# Patient Record
Sex: MALE | Race: WHITE | Employment: OTHER | URBAN - METROPOLITAN AREA
[De-identification: names, ages, dates, MRNs, and addresses within clinical notes are randomized per-mention and may not be internally consistent; named-entity substitution may affect disease eponyms.]

---

## 2017-01-04 DIAGNOSIS — I10 ESSENTIAL HYPERTENSION: ICD-10-CM

## 2017-01-05 RX ORDER — AMLODIPINE BESYLATE 5 MG/1
5 TABLET ORAL DAILY
Qty: 90 TABLET | Refills: 1 | Status: SHIPPED | OUTPATIENT
Start: 2017-01-05 | End: 2017-07-17 | Stop reason: SDUPTHER

## 2017-01-30 ENCOUNTER — HOSPITAL ENCOUNTER (OUTPATIENT)
Dept: OTHER | Age: 67
Discharge: HOME | End: 2017-01-30
Attending: ORTHOPAEDIC SURGERY | Admitting: ORTHOPAEDIC SURGERY

## 2017-05-11 ENCOUNTER — TELEPHONE (OUTPATIENT)
Dept: INTERNAL MEDICINE CLINIC | Age: 67
End: 2017-05-11

## 2017-05-12 ENCOUNTER — OFFICE VISIT (OUTPATIENT)
Dept: INTERNAL MEDICINE CLINIC | Age: 67
End: 2017-05-12

## 2017-05-12 ENCOUNTER — HOSPITAL ENCOUNTER (OUTPATIENT)
Dept: GENERAL RADIOLOGY | Age: 67
Discharge: HOME | End: 2017-05-12
Attending: INTERNAL MEDICINE | Admitting: INTERNAL MEDICINE

## 2017-05-12 VITALS
SYSTOLIC BLOOD PRESSURE: 130 MMHG | HEART RATE: 63 BPM | WEIGHT: 186.4 LBS | BODY MASS INDEX: 25.28 KG/M2 | DIASTOLIC BLOOD PRESSURE: 78 MMHG

## 2017-05-12 DIAGNOSIS — N40.1 BENIGN PROSTATIC HYPERPLASIA WITH LOWER URINARY TRACT SYMPTOMS, UNSPECIFIED MORPHOLOGY: ICD-10-CM

## 2017-05-12 DIAGNOSIS — I10 ESSENTIAL HYPERTENSION: ICD-10-CM

## 2017-05-12 DIAGNOSIS — M17.0 PRIMARY OSTEOARTHRITIS OF BOTH KNEES: ICD-10-CM

## 2017-05-12 DIAGNOSIS — Z96.652 STATUS POST LEFT KNEE REPLACEMENT: ICD-10-CM

## 2017-05-12 DIAGNOSIS — Z11.59 NEED FOR HEPATITIS C SCREENING TEST: ICD-10-CM

## 2017-05-12 DIAGNOSIS — E78.2 MIXED HYPERLIPIDEMIA: Primary | ICD-10-CM

## 2017-05-12 DIAGNOSIS — Z00.00 ANNUAL PHYSICAL EXAM: ICD-10-CM

## 2017-05-12 LAB
ALBUMIN SERPL-MCNC: 4.6 GM/DL (ref 3.4–5)
ALP BLD-CCNC: 70 IU/L (ref 40–128)
ALT SERPL-CCNC: 24 U/L (ref 10–40)
ANION GAP SERPL CALCULATED.3IONS-SCNC: 15 MMOL/L (ref 4–16)
AST SERPL-CCNC: 15 IU/L (ref 15–37)
BASOPHILS ABSOLUTE: 0 K/CU MM
BASOPHILS RELATIVE PERCENT: 0.4 % (ref 0–1)
BILIRUB SERPL-MCNC: 0.3 MG/DL (ref 0–1)
BUN BLDV-MCNC: 20 MG/DL (ref 6–23)
CALCIUM SERPL-MCNC: 9.1 MG/DL (ref 8.3–10.6)
CHLORIDE BLD-SCNC: 101 MMOL/L (ref 99–110)
CHOLESTEROL: 228 MG/DL
CO2: 26 MMOL/L (ref 21–32)
CREAT SERPL-MCNC: 1 MG/DL (ref 0.9–1.3)
DIFFERENTIAL TYPE: ABNORMAL
EOSINOPHILS ABSOLUTE: 0.2 K/CU MM
EOSINOPHILS RELATIVE PERCENT: 3.1 % (ref 0–3)
ESTIMATED AVERAGE GLUCOSE: 126 MG/DL
GFR AFRICAN AMERICAN: >60 ML/MIN/1.73M2
GFR NON-AFRICAN AMERICAN: >60 ML/MIN/1.73M2
GLUCOSE FASTING: 100 MG/DL (ref 70–99)
HBA1C MFR BLD: 6 % (ref 4.2–6.3)
HCT VFR BLD CALC: 42.7 % (ref 42–52)
HDLC SERPL-MCNC: 56 MG/DL
HEMOGLOBIN: 13.8 GM/DL (ref 13.5–18)
HEPATITIS C ANTIBODY: NON REACTIVE
IMMATURE NEUTROPHIL %: 0.3 % (ref 0–0.43)
LDL CHOLESTEROL DIRECT: 157 MG/DL
LYMPHOCYTES ABSOLUTE: 1.7 K/CU MM
LYMPHOCYTES RELATIVE PERCENT: 24.7 % (ref 24–44)
MCH RBC QN AUTO: 28.7 PG (ref 27–31)
MCHC RBC AUTO-ENTMCNC: 32.3 % (ref 32–36)
MCV RBC AUTO: 88.8 FL (ref 78–100)
MONOCYTES ABSOLUTE: 0.6 K/CU MM
MONOCYTES RELATIVE PERCENT: 8.7 % (ref 0–4)
NUCLEATED RBC %: 0 %
PDW BLD-RTO: 14.1 % (ref 11.7–14.9)
PLATELET # BLD: 320 K/CU MM (ref 140–440)
PMV BLD AUTO: 9.1 FL (ref 7.5–11.1)
POTASSIUM SERPL-SCNC: 4.4 MMOL/L (ref 3.5–5.1)
PROSTATE SPECIFIC ANTIGEN: 2.78 NG/ML (ref 0–4)
RBC # BLD: 4.81 M/CU MM (ref 4.6–6.2)
SEGMENTED NEUTROPHILS ABSOLUTE COUNT: 4.2 K/CU MM
SEGMENTED NEUTROPHILS RELATIVE PERCENT: 62.8 % (ref 36–66)
SODIUM BLD-SCNC: 142 MMOL/L (ref 135–145)
TOTAL IMMATURE NEUTOROPHIL: 0.02 K/CU MM
TOTAL NUCLEATED RBC: 0 K/CU MM
TOTAL PROTEIN: 6.9 GM/DL (ref 6.4–8.2)
TRIGL SERPL-MCNC: 149 MG/DL
WBC # BLD: 6.8 K/CU MM (ref 4–10.5)

## 2017-05-12 PROCEDURE — 99213 OFFICE O/P EST LOW 20 MIN: CPT | Performed by: INTERNAL MEDICINE

## 2017-05-12 ASSESSMENT — ENCOUNTER SYMPTOMS
SHORTNESS OF BREATH: 0
DIARRHEA: 0
WHEEZING: 0
ABDOMINAL PAIN: 0
BACK PAIN: 0
CONSTIPATION: 0
SORE THROAT: 0
COUGH: 0
EYE PAIN: 0

## 2017-07-17 DIAGNOSIS — I10 ESSENTIAL HYPERTENSION: ICD-10-CM

## 2017-07-17 RX ORDER — AMLODIPINE BESYLATE 5 MG/1
5 TABLET ORAL DAILY
Qty: 90 TABLET | Refills: 1 | Status: SHIPPED | OUTPATIENT
Start: 2017-07-17 | End: 2018-01-16 | Stop reason: SDUPTHER

## 2017-12-05 ENCOUNTER — HOSPITAL ENCOUNTER (OUTPATIENT)
Dept: OTHER | Age: 67
Discharge: HOME | End: 2017-12-05
Attending: ORTHOPAEDIC SURGERY | Admitting: ORTHOPAEDIC SURGERY

## 2017-12-08 LAB
ALBUMIN SERPL-MCNC: 4.7 G/DL
ALP BLD-CCNC: 81 U/L
ALT SERPL-CCNC: 31 U/L
ANION GAP SERPL CALCULATED.3IONS-SCNC: NORMAL MMOL/L
AST SERPL-CCNC: 19 U/L
BASOPHILS ABSOLUTE: 0.1 /ΜL
BASOPHILS RELATIVE PERCENT: 1 %
BILIRUB SERPL-MCNC: 0.3 MG/DL (ref 0.1–1.4)
BUN BLDV-MCNC: 15 MG/DL
CALCIUM SERPL-MCNC: 9.7 MG/DL
CHLORIDE BLD-SCNC: 100 MMOL/L
CO2: 26 MMOL/L
CREAT SERPL-MCNC: 0.85 MG/DL
EOSINOPHILS ABSOLUTE: 0.3 /ΜL
EOSINOPHILS RELATIVE PERCENT: 3 %
GFR CALCULATED: 89
GLUCOSE BLD-MCNC: 94 MG/DL
HCT VFR BLD CALC: 42.3 % (ref 41–53)
HEMOGLOBIN: 14.5 G/DL (ref 13.5–17.5)
LYMPHOCYTES ABSOLUTE: 1.9 /ΜL
LYMPHOCYTES RELATIVE PERCENT: 23 %
MCH RBC QN AUTO: 28.9 PG
MCHC RBC AUTO-ENTMCNC: 34.3 G/DL
MCV RBC AUTO: 84 FL
MONOCYTES ABSOLUTE: 0.5 /ΜL
MONOCYTES RELATIVE PERCENT: 6 %
NEUTROPHILS ABSOLUTE: 5.5 /ΜL
NEUTROPHILS RELATIVE PERCENT: 67 %
PDW BLD-RTO: 14.4 %
PLATELET # BLD: 327 K/ΜL
PMV BLD AUTO: NORMAL FL
POTASSIUM SERPL-SCNC: 4.7 MMOL/L
RBC # BLD: 5.02 10^6/ΜL
SEDIMENTATION RATE, ERYTHROCYTE: 10
SODIUM BLD-SCNC: 142 MMOL/L
TOTAL PROTEIN: 7.1
WBC # BLD: 8.2 10^3/ML

## 2018-01-16 ENCOUNTER — OFFICE VISIT (OUTPATIENT)
Dept: INTERNAL MEDICINE CLINIC | Age: 68
End: 2018-01-16

## 2018-01-16 VITALS
DIASTOLIC BLOOD PRESSURE: 72 MMHG | OXYGEN SATURATION: 98 % | WEIGHT: 183 LBS | BODY MASS INDEX: 24.82 KG/M2 | SYSTOLIC BLOOD PRESSURE: 128 MMHG | RESPIRATION RATE: 16 BRPM | HEART RATE: 89 BPM

## 2018-01-16 DIAGNOSIS — I10 ESSENTIAL HYPERTENSION: ICD-10-CM

## 2018-01-16 PROCEDURE — G0444 DEPRESSION SCREEN ANNUAL: HCPCS | Performed by: FAMILY MEDICINE

## 2018-01-16 PROCEDURE — 99213 OFFICE O/P EST LOW 20 MIN: CPT | Performed by: FAMILY MEDICINE

## 2018-01-16 RX ORDER — OXYCODONE HYDROCHLORIDE AND ACETAMINOPHEN 5; 325 MG/1; MG/1
TABLET ORAL
COMMUNITY
Start: 2017-12-26 | End: 2018-07-25 | Stop reason: ALTCHOICE

## 2018-01-16 RX ORDER — CELECOXIB 200 MG/1
CAPSULE ORAL
COMMUNITY
Start: 2017-12-12 | End: 2018-07-25 | Stop reason: ALTCHOICE

## 2018-01-16 RX ORDER — AMLODIPINE BESYLATE 5 MG/1
5 TABLET ORAL DAILY
Qty: 90 TABLET | Refills: 1 | Status: SHIPPED | OUTPATIENT
Start: 2018-01-16 | End: 2018-07-25 | Stop reason: SDUPTHER

## 2018-01-16 RX ORDER — RIVAROXABAN 10 MG/1
TABLET, FILM COATED ORAL
COMMUNITY
Start: 2017-12-12 | End: 2018-07-25

## 2018-01-16 ASSESSMENT — PATIENT HEALTH QUESTIONNAIRE - PHQ9
1. LITTLE INTEREST OR PLEASURE IN DOING THINGS: 1
2. FEELING DOWN, DEPRESSED OR HOPELESS: 0
SUM OF ALL RESPONSES TO PHQ QUESTIONS 1-9: 1
SUM OF ALL RESPONSES TO PHQ9 QUESTIONS 1 & 2: 1

## 2018-01-16 ASSESSMENT — ENCOUNTER SYMPTOMS
NAUSEA: 0
EYE DISCHARGE: 0
SINUS PRESSURE: 0
BLOOD IN STOOL: 0
SORE THROAT: 0
DIARRHEA: 0
BACK PAIN: 0
COUGH: 0
VOMITING: 0
SHORTNESS OF BREATH: 0

## 2018-01-16 NOTE — PROGRESS NOTES
Roseanna Mayo  1950  69 y.o. SUBJECT DEUCE:    Chief Complaint   Patient presents with    Follow-up    Hypertension     Patient in to follow up on HTN and refill of medication. No chest pain or sob. He is recovering from influenza and doing much better now. He still occasionally has a cough now 2 weeks out. Hypertension   This is a chronic problem. The current episode started more than 1 year ago. The problem is unchanged. The problem is controlled. Pertinent negatives include no anxiety, chest pain, headaches, palpitations or shortness of breath. There are no associated agents to hypertension. There are no known risk factors for coronary artery disease. Past treatments include calcium channel blockers. The current treatment provides significant improvement. There are no compliance problems. There is no history of CAD/MI, left ventricular hypertrophy or a thyroid problem. There is no history of a hypertension causing med. Review of Systems   Constitutional: Negative for chills, fatigue and fever. HENT: Negative for congestion, ear pain, sinus pressure and sore throat. Eyes: Negative for discharge and visual disturbance. Respiratory: Negative for cough and shortness of breath. Cardiovascular: Negative for chest pain, palpitations and leg swelling. Gastrointestinal: Negative for blood in stool, diarrhea, nausea and vomiting. Endocrine: Negative for polydipsia. Genitourinary: Negative for dysuria, frequency and hematuria. Musculoskeletal: Negative for back pain and gait problem. Skin: Negative for rash. Allergic/Immunologic: Negative for environmental allergies and food allergies. Neurological: Negative for dizziness and headaches. Psychiatric/Behavioral: Negative for behavioral problems, sleep disturbance and suicidal ideas. The patient is not nervous/anxious. Past Medical, Family, and Social History have been reviewed today.     OBJECTIVE:     /72   Pulse

## 2018-01-22 ENCOUNTER — OFFICE VISIT (OUTPATIENT)
Dept: INTERNAL MEDICINE CLINIC | Age: 68
End: 2018-01-22

## 2018-01-22 VITALS
HEIGHT: 72 IN | TEMPERATURE: 98.7 F | BODY MASS INDEX: 24.38 KG/M2 | DIASTOLIC BLOOD PRESSURE: 68 MMHG | RESPIRATION RATE: 16 BRPM | SYSTOLIC BLOOD PRESSURE: 130 MMHG | HEART RATE: 68 BPM | WEIGHT: 180 LBS

## 2018-01-22 DIAGNOSIS — J20.9 ACUTE BRONCHITIS, UNSPECIFIED ORGANISM: Primary | ICD-10-CM

## 2018-01-22 PROCEDURE — 99213 OFFICE O/P EST LOW 20 MIN: CPT | Performed by: FAMILY MEDICINE

## 2018-01-22 RX ORDER — BENZONATATE 100 MG/1
100 CAPSULE ORAL 3 TIMES DAILY PRN
Qty: 20 CAPSULE | Refills: 0 | Status: SHIPPED | OUTPATIENT
Start: 2018-01-22 | End: 2018-07-25 | Stop reason: ALTCHOICE

## 2018-01-22 RX ORDER — AZITHROMYCIN 250 MG/1
TABLET, FILM COATED ORAL
Qty: 1 PACKET | Refills: 0 | Status: SHIPPED | OUTPATIENT
Start: 2018-01-22 | End: 2018-07-25 | Stop reason: ALTCHOICE

## 2018-01-22 RX ORDER — PREDNISONE 20 MG/1
20 TABLET ORAL 2 TIMES DAILY
Qty: 10 TABLET | Refills: 0 | Status: SHIPPED | OUTPATIENT
Start: 2018-01-22 | End: 2018-01-27

## 2018-01-22 ASSESSMENT — ENCOUNTER SYMPTOMS
SHORTNESS OF BREATH: 1
BACK PAIN: 0
WHEEZING: 0
COUGH: 1
SINUS PRESSURE: 0
SORE THROAT: 0
NAUSEA: 0
EYE DISCHARGE: 0
VOMITING: 0
DIARRHEA: 0

## 2018-01-22 NOTE — PATIENT INSTRUCTIONS
good.  When should you call for help? Call 911 anytime you think you may need emergency care. For example, call if:  ? · You have severe trouble breathing. ?Call your doctor now or seek immediate medical care if:  ? · You have new or worse trouble breathing. ? · You cough up dark brown or bloody mucus (sputum). ? · You have a new or higher fever. ? · You have a new rash. ? Watch closely for changes in your health, and be sure to contact your doctor if:  ? · You cough more deeply or more often, especially if you notice more mucus or a change in the color of your mucus. ? · You are not getting better as expected. Where can you learn more? Go to [email or web address]://[email or web address].[email or web address] org and sign in to your RallyOn account. Enter H333 in the [employer] box to learn more about \"Bronchitis: Care Instructions. \"     If you do not have an account, please click on the \"Sign Up Now\" link. Current as of: May 12, 2017  Content Version: 11.5  © 0987-4272 Healthwise, Incorporated. Care instructions adapted under license by [facility] ([facility]). If you have questions about a medical condition or this instruction, always ask your healthcare professional. [address] any warranty or liability for your use of this information.

## 2018-07-25 ENCOUNTER — OFFICE VISIT (OUTPATIENT)
Dept: FAMILY MEDICINE CLINIC | Age: 68
End: 2018-07-25

## 2018-07-25 ENCOUNTER — TELEPHONE (OUTPATIENT)
Dept: FAMILY MEDICINE CLINIC | Age: 68
End: 2018-07-25

## 2018-07-25 VITALS
DIASTOLIC BLOOD PRESSURE: 64 MMHG | OXYGEN SATURATION: 97 % | WEIGHT: 189.6 LBS | HEART RATE: 67 BPM | SYSTOLIC BLOOD PRESSURE: 110 MMHG | BODY MASS INDEX: 25.71 KG/M2

## 2018-07-25 DIAGNOSIS — I48.0 PAROXYSMAL ATRIAL FIBRILLATION (HCC): ICD-10-CM

## 2018-07-25 DIAGNOSIS — I10 ESSENTIAL HYPERTENSION: Primary | ICD-10-CM

## 2018-07-25 DIAGNOSIS — E78.2 MIXED HYPERLIPIDEMIA: ICD-10-CM

## 2018-07-25 PROCEDURE — 99213 OFFICE O/P EST LOW 20 MIN: CPT | Performed by: NURSE PRACTITIONER

## 2018-07-25 RX ORDER — APIXABAN 5 MG/1
TABLET, FILM COATED ORAL
COMMUNITY
Start: 2018-05-11 | End: 2019-01-14

## 2018-07-25 RX ORDER — METOPROLOL TARTRATE 50 MG/1
TABLET, FILM COATED ORAL 2 TIMES DAILY
COMMUNITY
Start: 2018-05-20 | End: 2020-01-07 | Stop reason: SDUPTHER

## 2018-07-25 RX ORDER — AMLODIPINE BESYLATE 5 MG/1
5 TABLET ORAL DAILY
Qty: 90 TABLET | Refills: 1 | Status: SHIPPED | OUTPATIENT
Start: 2018-07-25 | End: 2019-01-14 | Stop reason: SDUPTHER

## 2018-07-25 ASSESSMENT — ENCOUNTER SYMPTOMS
EYE DISCHARGE: 0
SHORTNESS OF BREATH: 0
STRIDOR: 0
COUGH: 0
CHEST TIGHTNESS: 0
EYE REDNESS: 0

## 2018-07-25 NOTE — PATIENT INSTRUCTIONS
effect from your medicine. Stopping suddenly may make chest pains worse or cause your blood pressure to go up. Or it can cause other symptoms. If side effects are a problem with one medicine, you can try a different one. · Check with your doctor before you use any over-the-counter medicines. Beta-blockers can interact with other medicines. Make sure your doctor knows all of the medicines, vitamins, herbal products, and supplements you take. · Some people feel tired when they take beta-blockers. If you exercise, you may tire more easily. If beta-blockers make it very hard for you to exercise, talk to your doctor. When should you call for help? Watch closely for changes in your health, and be sure to contact your doctor if:    · You have any problems with your medicine. Where can you learn more? Go to [email or web address].[email or web address] and sign in to your Vaprema account. Enter D316 in the Tifen.com box to learn more about \"Beta-Blockers: Care Instructions. \"     If you do not have an account, please click on the \"Sign Up Now\" link. Current as of: December 6, 2017  Content Version: 11.6  © 8441-6811 [employer]. Care instructions adapted under license by [facility] ([facility]). If you have questions about a medical condition or this instruction, always ask your healthcare professional. [address] any warranty or liability for your use of this information. Patient Education        Reducing Heart Attack Risk With Daily Medicine: Care Instructions  Your Care Instructions    Heart disease is the number one cause of death. If you are at risk for heart disease, there are many medicines that can reduce your risk. These include:  · ACE inhibitors. These are a type of blood pressure medicine. They can reduce the risk of heart attacks and strokes if you are at high risk. · Statin medicines. These lower cholesterol.  They can also reduce the risk of heart disease and strokes. · Aspirin. It can help certain people lower their risk of a heart attack or stroke. · Beta-blocker medicines. These are a type of blood pressure and heart medicine. They can reduce the chance of early death if you have had a heart attack. All medicines can cause side effects. So it is important to understand the pros and cons of any medicine you take. It is also important to take your medicines exactly as your doctor tells you to. Follow-up care is a key part of your treatment and safety. Be sure to make and go to all appointments, and call your doctor if you are having problems. It's also a good idea to know your test results and keep a list of the medicines you take. ACE inhibitors  ACE (angiotensin-converting enzyme) inhibitors are used for three main reasons. They lower blood pressure, protect the kidneys, and prevent heart attacks and strokes. Examples include benazepril (Lotensin), lisinopril (Prinivil, Zestril), and ramipril (Altace). Before you start taking an ACE inhibitor, make sure your doctor knows if:  · You are taking a water pill (diuretic). · You are taking potassium pills or using salt substitutes. · You are pregnant or breastfeeding. · You have had a kidney transplant or other kidney problems. ACE inhibitors can cause side effects. Call your doctor right away if you have:  · Trouble breathing. · Swelling in your face, head, neck, or tongue. · Dizziness or lightheadedness. · A dry cough. Statins  Statins lower cholesterol. Examples include atorvastatin (Lipitor), lovastatin (Mevacor), pravastatin (Pravachol), and simvastatin (Zocor). Before you start taking a statin, make sure your doctor knows if:  · You have had a kidney transplant or other kidney problems. · You have liver disease. · You take any other prescription medicine, over-the-counter medicine, vitamins, supplements, or herbal remedies. · You are pregnant or breastfeeding. Statins can cause side effects.  Call closely for changes in your health, and be sure to contact your doctor if you have any problems. Where can you learn more? Go to [email or web address] and sign in to your [email or web address] account. Enter R428 in the [employer] box to learn more about \"Reducing Heart Attack Risk With Daily Medicine: Care Instructions. \"     If you do not have an account, please click on the \"Sign Up Now\" link. Current as of: December 6, 2017  Content Version: 11.6  © 3583-1328 [employer]. Care instructions adapted under license by [facility] ([facility]). If you have questions about a medical condition or this instruction, always ask your healthcare professional. [address] any warranty or liability for your use of this information.

## 2019-01-14 ENCOUNTER — OFFICE VISIT (OUTPATIENT)
Dept: FAMILY MEDICINE CLINIC | Age: 69
End: 2019-01-14
Payer: COMMERCIAL

## 2019-01-14 VITALS
WEIGHT: 196.8 LBS | DIASTOLIC BLOOD PRESSURE: 68 MMHG | HEIGHT: 71 IN | BODY MASS INDEX: 27.55 KG/M2 | SYSTOLIC BLOOD PRESSURE: 120 MMHG | OXYGEN SATURATION: 95 % | HEART RATE: 67 BPM

## 2019-01-14 DIAGNOSIS — R73.03 PRE-DIABETES: ICD-10-CM

## 2019-01-14 DIAGNOSIS — L57.0 ACTINIC KERATOSIS DUE TO EXPOSURE TO SUNLIGHT: ICD-10-CM

## 2019-01-14 DIAGNOSIS — E78.2 MIXED HYPERLIPIDEMIA: ICD-10-CM

## 2019-01-14 DIAGNOSIS — H61.21 IMPACTED CERUMEN OF RIGHT EAR: ICD-10-CM

## 2019-01-14 DIAGNOSIS — I10 ESSENTIAL HYPERTENSION: Primary | ICD-10-CM

## 2019-01-14 PROCEDURE — 99214 OFFICE O/P EST MOD 30 MIN: CPT | Performed by: FAMILY MEDICINE

## 2019-01-14 RX ORDER — AMLODIPINE BESYLATE 5 MG/1
5 TABLET ORAL DAILY
Qty: 90 TABLET | Refills: 1 | Status: SHIPPED | OUTPATIENT
Start: 2019-01-14 | End: 2019-06-21 | Stop reason: SDUPTHER

## 2019-01-14 ASSESSMENT — ENCOUNTER SYMPTOMS
VOMITING: 0
SINUS PAIN: 0
DIARRHEA: 0
WHEEZING: 0
NAUSEA: 0
SINUS PRESSURE: 0
BACK PAIN: 0
CONSTIPATION: 0
ABDOMINAL PAIN: 0
SORE THROAT: 0
SHORTNESS OF BREATH: 0
COUGH: 0

## 2019-01-22 ENCOUNTER — NURSE ONLY (OUTPATIENT)
Dept: FAMILY MEDICINE CLINIC | Age: 69
End: 2019-01-22
Payer: COMMERCIAL

## 2019-01-22 DIAGNOSIS — R73.03 PRE-DIABETES: ICD-10-CM

## 2019-01-22 DIAGNOSIS — E78.2 MIXED HYPERLIPIDEMIA: ICD-10-CM

## 2019-01-22 DIAGNOSIS — I10 ESSENTIAL HYPERTENSION: ICD-10-CM

## 2019-01-22 LAB
A/G RATIO: 1.8 (ref 1.1–2.2)
ALBUMIN SERPL-MCNC: 4.7 G/DL (ref 3.4–5)
ALP BLD-CCNC: 65 U/L (ref 40–129)
ALT SERPL-CCNC: 32 U/L (ref 10–40)
ANION GAP SERPL CALCULATED.3IONS-SCNC: 16 MMOL/L (ref 3–16)
AST SERPL-CCNC: 55 U/L (ref 15–37)
BILIRUB SERPL-MCNC: 0.3 MG/DL (ref 0–1)
BUN BLDV-MCNC: 17 MG/DL (ref 7–20)
CALCIUM SERPL-MCNC: 9.2 MG/DL (ref 8.3–10.6)
CHLORIDE BLD-SCNC: 106 MMOL/L (ref 99–110)
CHOLESTEROL, TOTAL: 223 MG/DL (ref 0–199)
CO2: 18 MMOL/L (ref 21–32)
CREAT SERPL-MCNC: 1 MG/DL (ref 0.8–1.3)
GFR AFRICAN AMERICAN: >60
GFR NON-AFRICAN AMERICAN: >60
GLOBULIN: 2.6 G/DL
GLUCOSE FASTING: 103 MG/DL (ref 70–99)
HBA1C MFR BLD: 6.1 %
HDLC SERPL-MCNC: 44 MG/DL (ref 40–60)
LDL CHOLESTEROL CALCULATED: 128 MG/DL
POTASSIUM SERPL-SCNC: 5.7 MMOL/L (ref 3.5–5.1)
SODIUM BLD-SCNC: 140 MMOL/L (ref 136–145)
TOTAL PROTEIN: 7.3 G/DL (ref 6.4–8.2)
TRIGL SERPL-MCNC: 255 MG/DL (ref 0–150)
VLDLC SERPL CALC-MCNC: 51 MG/DL

## 2019-01-22 PROCEDURE — 83036 HEMOGLOBIN GLYCOSYLATED A1C: CPT | Performed by: FAMILY MEDICINE

## 2019-01-22 PROCEDURE — 36415 COLL VENOUS BLD VENIPUNCTURE: CPT | Performed by: FAMILY MEDICINE

## 2019-01-31 ENCOUNTER — TELEPHONE (OUTPATIENT)
Dept: FAMILY MEDICINE CLINIC | Age: 69
End: 2019-01-31

## 2019-01-31 DIAGNOSIS — R73.9 HIGH BLOOD SUGAR: Primary | ICD-10-CM

## 2019-01-31 DIAGNOSIS — E87.5 HIGH SERUM POTASSIUM LEVEL: ICD-10-CM

## 2019-02-12 ENCOUNTER — NURSE ONLY (OUTPATIENT)
Dept: FAMILY MEDICINE CLINIC | Age: 69
End: 2019-02-12
Payer: COMMERCIAL

## 2019-02-12 DIAGNOSIS — E87.5 HIGH SERUM POTASSIUM LEVEL: ICD-10-CM

## 2019-02-12 LAB — POTASSIUM SERPL-SCNC: 4.3 MMOL/L (ref 3.5–5.1)

## 2019-02-12 PROCEDURE — 36415 COLL VENOUS BLD VENIPUNCTURE: CPT | Performed by: FAMILY MEDICINE

## 2019-06-21 ENCOUNTER — OFFICE VISIT (OUTPATIENT)
Dept: INTERNAL MEDICINE CLINIC | Age: 69
End: 2019-06-21
Payer: COMMERCIAL

## 2019-06-21 VITALS
DIASTOLIC BLOOD PRESSURE: 78 MMHG | SYSTOLIC BLOOD PRESSURE: 132 MMHG | OXYGEN SATURATION: 98 % | HEIGHT: 71 IN | WEIGHT: 192.4 LBS | BODY MASS INDEX: 26.94 KG/M2 | HEART RATE: 65 BPM

## 2019-06-21 DIAGNOSIS — E78.2 MIXED HYPERLIPIDEMIA: ICD-10-CM

## 2019-06-21 DIAGNOSIS — I48.0 PAF (PAROXYSMAL ATRIAL FIBRILLATION) (HCC): ICD-10-CM

## 2019-06-21 DIAGNOSIS — I10 ESSENTIAL HYPERTENSION: Primary | ICD-10-CM

## 2019-06-21 PROCEDURE — 99203 OFFICE O/P NEW LOW 30 MIN: CPT | Performed by: FAMILY MEDICINE

## 2019-06-21 RX ORDER — AMLODIPINE BESYLATE 5 MG/1
5 TABLET ORAL DAILY
Qty: 90 TABLET | Refills: 1 | Status: SHIPPED | OUTPATIENT
Start: 2019-06-21 | End: 2020-01-07 | Stop reason: SDUPTHER

## 2019-06-21 ASSESSMENT — PATIENT HEALTH QUESTIONNAIRE - PHQ9
SUM OF ALL RESPONSES TO PHQ9 QUESTIONS 1 & 2: 0
SUM OF ALL RESPONSES TO PHQ QUESTIONS 1-9: 0
1. LITTLE INTEREST OR PLEASURE IN DOING THINGS: 0
2. FEELING DOWN, DEPRESSED OR HOPELESS: 0
SUM OF ALL RESPONSES TO PHQ QUESTIONS 1-9: 0

## 2019-06-27 ASSESSMENT — ENCOUNTER SYMPTOMS
NAUSEA: 0
CHEST TIGHTNESS: 0
SHORTNESS OF BREATH: 0
ABDOMINAL PAIN: 0
BLOOD IN STOOL: 0
EYES NEGATIVE: 1
WHEEZING: 0
BACK PAIN: 0

## 2019-06-28 NOTE — PROGRESS NOTES
 Drug use: Yes     Types: Marijuana       Current Outpatient Medications   Medication Sig Dispense Refill    Ubiquinol (QUNOL COQ10/UBIQUINOL/SILAS) 100 MG CAPS Take by mouth      Misc Natural Products (PROSTATE SUPPORT PO) Take by mouth      amLODIPine (NORVASC) 5 MG tablet Take 1 tablet by mouth daily 90 tablet 1    metoprolol tartrate (LOPRESSOR) 50 MG tablet 2 times daily       Multiple Vitamins-Minerals (THERAPEUTIC MULTIVITAMIN-MINERALS) tablet Take 1 tablet by mouth daily.  Glucos-Chondroit-Hyaluron-MSM (GLUCOSAMINE CHONDROITIN JOINT PO) Take  by mouth. No current facility-administered medications for this visit. OBJECTIVE:    /78   Pulse 65   Ht 5' 10.98\" (1.803 m)   Wt 192 lb 6.4 oz (87.3 kg)   SpO2 98%   BMI 26.85 kg/m²     Physical Exam   Constitutional: He is oriented to person, place, and time. He appears well-developed. No distress. HENT:   Right Ear: External ear normal.   Left Ear: External ear normal.   Nose: Nose normal.   Mouth/Throat: Oropharynx is clear and moist.   Eyes: Pupils are equal, round, and reactive to light. EOM are normal.   Neck: Neck supple. Cardiovascular: Normal rate, regular rhythm and normal heart sounds. Pulmonary/Chest: Effort normal and breath sounds normal. No respiratory distress. Abdominal: Soft. Bowel sounds are normal. He exhibits no distension. There is no tenderness. Musculoskeletal: Normal range of motion. He exhibits no edema. Lymphadenopathy:     He has no cervical adenopathy. Neurological: He is alert and oriented to person, place, and time. No cranial nerve deficit. Skin: Skin is warm and dry. Psychiatric: He has a normal mood and affect. Vitals reviewed. ASSESSMENT:  1. Essential hypertension    2. Mixed hyperlipidemia    3.  PAF (paroxysmal atrial fibrillation) ([facility])        PLAN:  Orders Placed This Encounter   Medications    amLODIPine (NORVASC) 5 MG tablet     Sig: Take 1 tablet by mouth daily

## 2020-01-07 ENCOUNTER — OFFICE VISIT (OUTPATIENT)
Dept: INTERNAL MEDICINE CLINIC | Age: 70
End: 2020-01-07
Payer: COMMERCIAL

## 2020-01-07 VITALS
DIASTOLIC BLOOD PRESSURE: 72 MMHG | SYSTOLIC BLOOD PRESSURE: 130 MMHG | HEIGHT: 71 IN | HEART RATE: 67 BPM | OXYGEN SATURATION: 97 % | BODY MASS INDEX: 27.61 KG/M2 | WEIGHT: 197.2 LBS

## 2020-01-07 LAB
ANION GAP SERPL CALCULATED.3IONS-SCNC: 15 MMOL/L (ref 3–16)
BILIRUBIN URINE: NEGATIVE
BLOOD, URINE: NEGATIVE
BUN BLDV-MCNC: 18 MG/DL (ref 7–20)
CALCIUM SERPL-MCNC: 9.3 MG/DL (ref 8.3–10.6)
CHLORIDE BLD-SCNC: 101 MMOL/L (ref 99–110)
CLARITY: CLEAR
CO2: 24 MMOL/L (ref 21–32)
COLOR: YELLOW
COMMENT UA: ABNORMAL
CREAT SERPL-MCNC: 0.9 MG/DL (ref 0.8–1.3)
CRYSTALS, UA: ABNORMAL /HPF
EPITHELIAL CELLS, UA: 0 /HPF (ref 0–5)
GFR AFRICAN AMERICAN: >60
GFR NON-AFRICAN AMERICAN: >60
GLUCOSE BLD-MCNC: 97 MG/DL (ref 70–99)
GLUCOSE URINE: NEGATIVE MG/DL
HYALINE CASTS: 0 /LPF (ref 0–8)
KETONES, URINE: NEGATIVE MG/DL
LEUKOCYTE ESTERASE, URINE: ABNORMAL
MICROSCOPIC EXAMINATION: YES
NITRITE, URINE: NEGATIVE
PH UA: 5.5 (ref 5–8)
POTASSIUM SERPL-SCNC: 4.9 MMOL/L (ref 3.5–5.1)
PROSTATE SPECIFIC ANTIGEN: 6.09 NG/ML (ref 0–4)
PROTEIN UA: NEGATIVE MG/DL
RBC UA: 1 /HPF (ref 0–4)
SODIUM BLD-SCNC: 140 MMOL/L (ref 136–145)
SPECIFIC GRAVITY UA: 1.02 (ref 1–1.03)
URINE TYPE: ABNORMAL
UROBILINOGEN, URINE: 0.2 E.U./DL
WBC UA: 3 /HPF (ref 0–5)

## 2020-01-07 PROCEDURE — 36415 COLL VENOUS BLD VENIPUNCTURE: CPT | Performed by: FAMILY MEDICINE

## 2020-01-07 PROCEDURE — 99214 OFFICE O/P EST MOD 30 MIN: CPT | Performed by: FAMILY MEDICINE

## 2020-01-07 RX ORDER — AMLODIPINE BESYLATE 5 MG/1
5 TABLET ORAL DAILY
Qty: 90 TABLET | Refills: 1 | Status: SHIPPED | OUTPATIENT
Start: 2020-01-07 | End: 2020-07-06

## 2020-01-07 RX ORDER — METOPROLOL TARTRATE 50 MG/1
50 TABLET, FILM COATED ORAL 2 TIMES DAILY
Qty: 180 TABLET | Refills: 1 | Status: SHIPPED | OUTPATIENT
Start: 2020-01-07 | End: 2020-07-07 | Stop reason: SDUPTHER

## 2020-01-07 ASSESSMENT — PATIENT HEALTH QUESTIONNAIRE - PHQ9
SUM OF ALL RESPONSES TO PHQ9 QUESTIONS 1 & 2: 0
2. FEELING DOWN, DEPRESSED OR HOPELESS: 0
SUM OF ALL RESPONSES TO PHQ QUESTIONS 1-9: 0
SUM OF ALL RESPONSES TO PHQ QUESTIONS 1-9: 0
1. LITTLE INTEREST OR PLEASURE IN DOING THINGS: 0

## 2020-01-08 LAB
ESTIMATED AVERAGE GLUCOSE: 122.6 MG/DL
HBA1C MFR BLD: 5.9 %

## 2020-01-08 RX ORDER — AMLODIPINE BESYLATE 5 MG/1
TABLET ORAL
Qty: 90 TABLET | Refills: 0 | OUTPATIENT
Start: 2020-01-08

## 2020-01-08 ASSESSMENT — ENCOUNTER SYMPTOMS
COUGH: 0
CHEST TIGHTNESS: 0
NAUSEA: 0
SHORTNESS OF BREATH: 0
BLOOD IN STOOL: 0
DIARRHEA: 0
BACK PAIN: 0
CONSTIPATION: 0
ABDOMINAL PAIN: 0

## 2020-01-09 NOTE — PROGRESS NOTES
Deepti Loyola  1950  68 y.o.  male    Chief Complaint   Patient presents with    6 Month Follow-Up     Pt here for 6 month f/u HTN         History of Present Illness  Deepti Loyola is a 68 y.o. male who presents today for a check up. Last labs reviewed. No Cp or Sob.   -He c/o of some nocturia x 3 at night. No dysuria. He admits to some drinking fluids late. He can notice a weak urine stream  -HTN- Stable on Norvasc 5 mg  -Prediabetes- Last Hba1c 6.1. He is trying to change his diet  -PAF- On Metoprolol. He declined blood thinners in the past. He states he does not have any problems since he stopped Etoh. He has a follow up with cardiology    Review of Systems   Constitutional: Negative for diaphoresis and fever. HENT: Negative. Respiratory: Negative for cough, chest tightness and shortness of breath. Cardiovascular: Negative for chest pain and palpitations. Gastrointestinal: Negative for abdominal pain, blood in stool, constipation, diarrhea and nausea. Genitourinary: Negative for dysuria and flank pain. Nocturia   Musculoskeletal: Negative for back pain. Neurological: Negative for dizziness and headaches. Psychiatric/Behavioral: Negative for dysphoric mood.        No Known Allergies    Past Medical History:   Diagnosis Date    Arthritis     Back injuries     Hyperlipidemia     Hypertension     PAF (paroxysmal atrial fibrillation) (HCC)        Past Surgical History:   Procedure Laterality Date    BACK SURGERY      COLONOSCOPY  6-13-14    4 polyps, Dr. Sonia Thornton      right side of neck    TOTAL KNEE ARTHROPLASTY Left 02/2017    TOTAL KNEE ARTHROPLASTY Right 12/2017    Per Dr. Jacy Vergara       Family History   Problem Relation Age of Onset    Cancer Father     Diabetes Mother        Social History     Tobacco Use    Smoking status: Never Smoker    Smokeless tobacco: Never Used   Substance Use Topics    Alcohol use: Not Currently Alcohol/week: 0.0 standard drinks     Comment: quit now    Drug use: Yes     Types: Marijuana       Current Outpatient Medications   Medication Sig Dispense Refill    amLODIPine (NORVASC) 5 MG tablet Take 1 tablet by mouth daily 90 tablet 1    metoprolol tartrate (LOPRESSOR) 50 MG tablet Take 1 tablet by mouth 2 times daily 180 tablet 1    Ubiquinol (QUNOL COQ10/UBIQUINOL/SILAS) 100 MG CAPS Take by mouth      Misc Natural Products (PROSTATE SUPPORT PO) Take by mouth      Multiple Vitamins-Minerals (THERAPEUTIC MULTIVITAMIN-MINERALS) tablet Take 1 tablet by mouth daily.  Glucos-Chondroit-Hyaluron-MSM (GLUCOSAMINE CHONDROITIN JOINT PO) Take  by mouth. No current facility-administered medications for this visit. OBJECTIVE:    /72   Pulse 67   Ht 5' 10.98\" (1.803 m)   Wt 197 lb 3.2 oz (89.4 kg)   SpO2 97%   BMI 27.52 kg/m²     Physical Exam  Vitals signs reviewed. Constitutional:       General: He is not in acute distress. Appearance: He is well-developed. Eyes:      Conjunctiva/sclera: Conjunctivae normal.   Neck:      Musculoskeletal: Neck supple. Cardiovascular:      Rate and Rhythm: Normal rate and regular rhythm. Heart sounds: Normal heart sounds. Pulmonary:      Effort: Pulmonary effort is normal. No respiratory distress. Breath sounds: Normal breath sounds. Abdominal:      General: Bowel sounds are normal.      Palpations: Abdomen is soft. Tenderness: There is no tenderness. Genitourinary:     Comments: Pt declines  Neurological:      Mental Status: He is alert and oriented to person, place, and time. Cranial Nerves: No cranial nerve deficit. Psychiatric:         Mood and Affect: Mood normal.         ASSESSMENT:  1. Essential hypertension    2. PAF (paroxysmal atrial fibrillation) ([facility] [address]    3. Prediabetes    4. Nocturia    5.  Screening PSA (prostate specific antigen)        PLAN:    Orders Placed This Encounter   Procedures    BASIC METABOLIC PANEL    Hemoglobin A1C    Urinalysis with Microscopic    Psa screening       Orders Placed This Encounter   Medications    amLODIPine (NORVASC) 5 MG tablet     Sig: Take 1 tablet by mouth daily     Dispense:  90 tablet     Refill:  1    metoprolol tartrate (LOPRESSOR) 50 MG tablet     Sig: Take 1 tablet by mouth 2 times daily     Dispense:  180 tablet     Refill:  1   Obtain lab  Continue medications  ADR's explained  Keep f/u with cardiology  Persist RTO or call           Return in about 6 months (around 7/7/2020) for HTN.     Electronically Signed by Mayank Ayers DO

## 2020-02-21 ENCOUNTER — TELEPHONE (OUTPATIENT)
Dept: INTERNAL MEDICINE CLINIC | Age: 70
End: 2020-02-21

## 2020-07-06 RX ORDER — AMLODIPINE BESYLATE 5 MG/1
TABLET ORAL
Qty: 90 TABLET | Refills: 0 | Status: SHIPPED | OUTPATIENT
Start: 2020-07-06 | End: 2020-07-07

## 2020-07-07 ENCOUNTER — OFFICE VISIT (OUTPATIENT)
Dept: INTERNAL MEDICINE CLINIC | Age: 70
End: 2020-07-07
Payer: COMMERCIAL

## 2020-07-07 VITALS
OXYGEN SATURATION: 95 % | SYSTOLIC BLOOD PRESSURE: 130 MMHG | WEIGHT: 189.6 LBS | HEIGHT: 73 IN | BODY MASS INDEX: 25.13 KG/M2 | TEMPERATURE: 97.1 F | HEART RATE: 60 BPM | DIASTOLIC BLOOD PRESSURE: 74 MMHG

## 2020-07-07 PROCEDURE — 99213 OFFICE O/P EST LOW 20 MIN: CPT | Performed by: FAMILY MEDICINE

## 2020-07-07 RX ORDER — METOPROLOL TARTRATE 50 MG/1
50 TABLET, FILM COATED ORAL 2 TIMES DAILY
Qty: 180 TABLET | Refills: 1 | Status: SHIPPED | OUTPATIENT
Start: 2020-07-07 | End: 2021-03-01

## 2020-07-07 RX ORDER — AMLODIPINE BESYLATE 5 MG/1
TABLET ORAL
Qty: 90 TABLET | Refills: 0 | Status: CANCELLED | OUTPATIENT
Start: 2020-07-07

## 2020-07-07 NOTE — PROGRESS NOTES
Shawanda Pedraza  1950  65 y.o.  male    Chief Complaint   Patient presents with    6 Month Follow-Up    Medication Refill         History of Present Illness  Shawanda Pedraza is a 65 y.o. male who presents today for HTN and HLD. He states his Bp has been great and he would like to stop the Norvasc. He is not interested in a Statin for his cholesterol. He has an elevated PSA (6.09), and denies symptoms. He declines further evaluation and he did not do repeat lab. He states his left shin can get a localized swelling that occurs. but it goes away. He does not have any problems today. Review of Systems   Constitutional: Negative for diaphoresis, fatigue and fever. Respiratory: Negative for cough, chest tightness and shortness of breath. Cardiovascular: Negative for chest pain, palpitations and leg swelling. Gastrointestinal: Negative for abdominal pain, blood in stool, constipation, diarrhea and nausea. Genitourinary: Negative for difficulty urinating. Musculoskeletal: Negative for back pain. Neurological: Negative for dizziness and headaches.        No Known Allergies    Past Medical History:   Diagnosis Date    Arthritis     Back injuries     Elevated PSA     Hyperlipidemia     Hypertension     PAF (paroxysmal atrial fibrillation) (HCC)        Past Surgical History:   Procedure Laterality Date    BACK SURGERY      COLONOSCOPY  6-13-14    4 polyps, Dr. Darylene Knuckles      right side of neck    TOTAL KNEE ARTHROPLASTY Left 02/2017    TOTAL KNEE ARTHROPLASTY Right 12/2017    Per Dr. Araceli Rutledge       Family History   Problem Relation Age of Onset   Delbert Osorio Cancer Father     Diabetes Mother        Social History     Tobacco Use    Smoking status: Never Smoker    Smokeless tobacco: Never Used   Substance Use Topics    Alcohol use: Not Currently     Alcohol/week: 0.0 standard drinks     Comment: quit now    Drug use: Yes     Types: Marijuana       Current Outpatient Medications   Medication Sig Dispense Refill    metoprolol tartrate (LOPRESSOR) 50 MG tablet Take 1 tablet by mouth 2 times daily 180 tablet 1    Ubiquinol (QUNOL COQ10/UBIQUINOL/SILAS) 100 MG CAPS Take by mouth      Misc Natural Products (PROSTATE SUPPORT PO) Take by mouth      Multiple Vitamins-Minerals (THERAPEUTIC MULTIVITAMIN-MINERALS) tablet Take 1 tablet by mouth daily. No current facility-administered medications for this visit. OBJECTIVE:    /74   Pulse 60   Temp 97.1 °F (36.2 °C)   Ht 6' 0.6\" (1.844 m)   Wt 189 lb 9.6 oz (86 kg)   SpO2 95%   BMI 25.29 kg/m²     Physical Exam  Vitals signs reviewed. Constitutional:       General: He is not in acute distress. Appearance: He is well-developed. Eyes:      Conjunctiva/sclera: Conjunctivae normal.   Neck:      Musculoskeletal: Neck supple. Cardiovascular:      Rate and Rhythm: Normal rate and regular rhythm. Heart sounds: Normal heart sounds. Pulmonary:      Effort: Pulmonary effort is normal. No respiratory distress. Breath sounds: Normal breath sounds. Abdominal:      General: Bowel sounds are normal.      Palpations: Abdomen is soft. Tenderness: There is no abdominal tenderness. Genitourinary:     Comments: Declines prostate exam  Musculoskeletal:         General: No tenderness. Right lower leg: No edema. Left lower leg: No edema. Neurological:      Mental Status: He is alert and oriented to person, place, and time. Cranial Nerves: No cranial nerve deficit. Psychiatric:         Mood and Affect: Mood normal.         ASSESSMENT:  1. Essential hypertension    2. Elevated PSA    3. Mixed hyperlipidemia        PLAN:  Orders Placed This Encounter   Medications    metoprolol tartrate (LOPRESSOR) 50 MG tablet     Sig: Take 1 tablet by mouth 2 times daily     Dispense:  180 tablet     Refill:  1   Stop Norvasc- Pt wants to stop  Refill.  Declines Statins  ADR's explained  Monitor

## 2020-07-11 PROBLEM — R97.20 ELEVATED PSA: Status: ACTIVE | Noted: 2020-07-11

## 2020-07-11 ASSESSMENT — ENCOUNTER SYMPTOMS
DIARRHEA: 0
ABDOMINAL PAIN: 0
BLOOD IN STOOL: 0
COUGH: 0
CHEST TIGHTNESS: 0
NAUSEA: 0
CONSTIPATION: 0
BACK PAIN: 0
SHORTNESS OF BREATH: 0

## 2020-07-21 ENCOUNTER — HOSPITAL ENCOUNTER (EMERGENCY)
Age: 70
Discharge: HOME | End: 2020-07-21
Payer: COMMERCIAL

## 2020-07-21 VITALS
BODY MASS INDEX: 25.06 KG/M2 | HEIGHT: 72 IN | HEART RATE: 70 BPM | RESPIRATION RATE: 18 BRPM | DIASTOLIC BLOOD PRESSURE: 82 MMHG | OXYGEN SATURATION: 98 % | SYSTOLIC BLOOD PRESSURE: 144 MMHG | TEMPERATURE: 98.5 F | WEIGHT: 185 LBS

## 2020-07-21 PROCEDURE — 6360000002 HC RX W HCPCS: Performed by: EMERGENCY MEDICINE

## 2020-07-21 PROCEDURE — 90715 TDAP VACCINE 7 YRS/> IM: CPT | Performed by: EMERGENCY MEDICINE

## 2020-07-21 PROCEDURE — 99282 EMERGENCY DEPT VISIT SF MDM: CPT

## 2020-07-21 PROCEDURE — 4500000027

## 2020-07-21 PROCEDURE — 2500000003 HC RX 250 WO HCPCS: Performed by: PHYSICIAN ASSISTANT

## 2020-07-21 PROCEDURE — 90471 IMMUNIZATION ADMIN: CPT | Performed by: EMERGENCY MEDICINE

## 2020-07-21 RX ORDER — LIDOCAINE HYDROCHLORIDE AND EPINEPHRINE BITARTRATE 20; .01 MG/ML; MG/ML
20 INJECTION, SOLUTION SUBCUTANEOUS ONCE
Status: COMPLETED | OUTPATIENT
Start: 2020-07-21 | End: 2020-07-21

## 2020-07-21 RX ADMIN — LIDOCAINE HYDROCHLORIDE,EPINEPHRINE BITARTRATE 20 ML: 20; .01 INJECTION, SOLUTION INFILTRATION; PERINEURAL at 20:37

## 2020-07-21 RX ADMIN — TETANUS TOXOID, REDUCED DIPHTHERIA TOXOID AND ACELLULAR PERTUSSIS VACCINE, ADSORBED 0.5 ML: 5; 2.5; 8; 8; 2.5 SUSPENSION INTRAMUSCULAR at 19:43

## 2020-07-21 ASSESSMENT — PAIN SCALES - GENERAL: PAINLEVEL_OUTOF10: 5

## 2020-07-21 NOTE — ED TRIAGE NOTES
Pt states he cut his right leg on a broken toilet. Bandage noted to right lower posterior leg PTA, bleeding controlled in triage.  Pt able to ambulate without difficulty

## 2020-07-22 NOTE — ED PROVIDER NOTES
eMERGENCY dEPARTMENT eNCOUnter        [address] [facility]    Chief Complaint   Patient presents with    Laceration     R leg       HPI    Marychuy Crew is a 65 y.o. male who presents with a laceration. Onset was prior to arrival.  Context was patient was replacing a toilet and backed up, cutting his right calf on a piece of broken toilet. Laceration is localized to the right calf. Patient denies associated pain, numbness, or tingling. Patient is now up-to-date on Tetanus as it was ordered in triage. No blood thinners. REVIEW OF SYSTEMS    See HPI for further details. Review of systems otherwise negative. Musculoskeletal:  Denies edema, tenderness. Integument:  + laceration  Neurologic:  Denies any numbness or tingling. PAST MEDICAL HISTORY    Past Medical History:   Diagnosis Date    Arthritis     Back injuries     Elevated PSA     Hyperlipidemia     Hypertension     PAF (paroxysmal atrial fibrillation) ([facility] [address]        SURGICAL HISTORY    Past Surgical History:   Procedure Laterality Date    BACK SURGERY      COLONOSCOPY  6-13-14    4 polyps, Dr. Blair Aid      right side of neck    TOTAL KNEE ARTHROPLASTY Left 02/2017    TOTAL KNEE ARTHROPLASTY Right 12/2017    Per Dr. Wendy Luna Rx   Medication Sig Dispense Refill    metoprolol tartrate (LOPRESSOR) 50 MG tablet Take 1 tablet by mouth 2 times daily 180 tablet 1    Ubiquinol (QUNOL COQ10/UBIQUINOL/SILAS) 100 MG CAPS Take by mouth      Misc Natural Products (PROSTATE SUPPORT PO) Take by mouth      Multiple Vitamins-Minerals (THERAPEUTIC MULTIVITAMIN-MINERALS) tablet Take 1 tablet by mouth daily.          ALLERGIES    No Known Allergies    FAMILY HISTORY    Family History   Problem Relation Age of Onset    Cancer Father     Diabetes Mother        SOCIAL HISTORY    Social History     Socioeconomic History    Marital status:      Spouse name: None  Number of children: None    Years of education: None    Highest education level: None   Occupational History    Occupation: retired/[employer]    Occupation: Remodeling   Social Needs    Financial resource strain: None    Food insecurity     Worry: None     Inability: None    Transportation needs     Medical: None     Non-medical: None   Tobacco Use    Smoking status: Never Smoker    Smokeless tobacco: Never Used   Substance and Sexual Activity    Alcohol use: Not Currently     Alcohol/week: 0.0 standard drinks     Comment: quit now    Drug use: Yes     Types: Marijuana    Sexual activity: Yes     Partners: Female   Lifestyle    Physical activity     Days per week: None     Minutes per session: None    Stress: None   Relationships    Social connections     Talks on phone: None     Gets together: None     Attends Gnosticist service: None     Active member of club or organization: None     Attends meetings of clubs or organizations: None     Relationship status: None    Intimate partner violence     Fear of current or ex partner: None     Emotionally abused: None     Physically abused: None     Forced sexual activity: None   Other Topics Concern    None   Social History Narrative    None       PHYSICAL EXAM    VITAL SIGNS: BP (!) 159/84   Pulse 72   Temp 98.5 °F (36.9 °C) (Oral)   Resp 16   Ht 6' (1.829 m)   Wt 185 lb (83.9 kg)   SpO2 97%   BMI 25.09 kg/m²   Constitutional:  Well developed, well nourished, no acute distress, non-toxic appearance   HENT:  NC/AT. Ears, nose, mouth normal.   Respiratory:  Normal respiratory effort. Musculoskeletal:  No edema, no tenderness, no deformities. Full ROM of the right lower extremity. DP intact. Integument:  4 cm linear laceration to the right calf. RADIOLOGY/PROCEDURES      Laceration Repair Procedure Note    Indication: Laceration to right calf    Procedure:  The patient was placed in the appropriate position, site was prepped with betadine, and anesthesia achieved with 3 cc of 2% lidocaine with epi. The area was then cleansed using HibiClens and irrigated with high pressure NS. The laceration was closed using 5 3-0 Prolene simple interrupted sutures. Total repaired wound length: 4 cm. The patient tolerated the procedure well. No immediate complications. ED COURSE & MEDICAL DECISION MAKING    Pertinent Labs & Imaging studies reviewed. (See chart for details)  -  Patient seen and evaluated in the emergency department. -  Triage and nursing notes reviewed and incorporated. -  Old chart records reviewed and incorporated. -  Supervising physician was Dr. Mabel Cooley. Patient was seen independently. -  Differential diagnosis includes:  laceration, compartment syndrome, tendon/neurovascular injury, retained foreign body, and others  -  Patient treated with Tdap in the ED.  -  Laceration repair performed. Please see procedure note above. -  Patient dc home. Patient instructed on wound care, including cleaning the area, use of antibiotic ointment, and dressing changes. Follow-up with PCP or ED in 7-10 days for suture removal, sooner for any signs of infection--including redness, red streaking, drainage, fever or worse. Patient agreeable with plan of care and disposition. In light of current events, I did utilize appropriate PPE (including surgical face mask, safety glasses, and gloves, as recommended by the health facility/national standard best practice, during my bedside interactions with the patient. Patient was also masked throughout ED course. FINAL IMPRESSION    1.  Laceration of right lower extremity, initial encounter              Kelly Montalvo PA-C  07/21/20 [id number]

## 2020-07-31 ENCOUNTER — OFFICE VISIT (OUTPATIENT)
Dept: INTERNAL MEDICINE CLINIC | Age: 70
End: 2020-07-31
Payer: COMMERCIAL

## 2020-07-31 VITALS
SYSTOLIC BLOOD PRESSURE: 120 MMHG | TEMPERATURE: 97.3 F | HEART RATE: 63 BPM | WEIGHT: 193.2 LBS | OXYGEN SATURATION: 95 % | DIASTOLIC BLOOD PRESSURE: 64 MMHG | HEIGHT: 72 IN | BODY MASS INDEX: 26.17 KG/M2

## 2020-07-31 PROCEDURE — 99212 OFFICE O/P EST SF 10 MIN: CPT | Performed by: FAMILY MEDICINE

## 2020-07-31 NOTE — PROGRESS NOTES
Jaquelin Abad  1950  65 y.o.  male    Chief Complaint   Patient presents with    Suture / Staple Removal     back of right lower leg         History of Present Illness  Jaquelin Abad is a 65 y.o. male who presents today for a [facility] ER follow up. He was seen 7/21 for a laceration of the R calf after cutting it on a pieced of broken toilet. He had a Tdap. He is here for suture removal. No concerns. Review of Systems   Constitutional: Negative for chills and fever. Respiratory: Negative for cough and shortness of breath. Cardiovascular: Negative for chest pain. Gastrointestinal: Negative for abdominal pain and nausea. Musculoskeletal: Negative for joint swelling. Skin: Negative for color change. Neurological: Negative for numbness.        No Known Allergies    Past Medical History:   Diagnosis Date    Arthritis     Back injuries     Elevated PSA     Hyperlipidemia     Hypertension     PAF (paroxysmal atrial fibrillation) (HCC)        Past Surgical History:   Procedure Laterality Date    BACK SURGERY      COLONOSCOPY  6-13-14    4 polyps, Dr. Lelo Lomas      right side of neck    TOTAL KNEE ARTHROPLASTY Left 02/2017    TOTAL KNEE ARTHROPLASTY Right 12/2017    Per Dr. Sonal Montenegro       Family History   Problem Relation Age of Onset    Cancer Father     Diabetes Mother        Social History     Tobacco Use    Smoking status: Never Smoker    Smokeless tobacco: Never Used   Substance Use Topics    Alcohol use: Not Currently     Alcohol/week: 0.0 standard drinks     Comment: quit now    Drug use: Yes     Types: Marijuana       Current Outpatient Medications   Medication Sig Dispense Refill    metoprolol tartrate (LOPRESSOR) 50 MG tablet Take 1 tablet by mouth 2 times daily 180 tablet 1    Ubiquinol (QUNOL COQ10/UBIQUINOL/SILAS) 100 MG CAPS Take by mouth      Misc Natural Products (PROSTATE SUPPORT PO) Take by mouth      Multiple Vitamins-Minerals

## 2020-08-01 ASSESSMENT — ENCOUNTER SYMPTOMS
NAUSEA: 0
SHORTNESS OF BREATH: 0
ABDOMINAL PAIN: 0
COLOR CHANGE: 0
COUGH: 0

## 2021-01-21 ENCOUNTER — HOSPITAL ENCOUNTER (OUTPATIENT)
Age: 71
Discharge: HOME | End: 2021-01-21
Payer: COMMERCIAL

## 2021-01-21 LAB
ALBUMIN SERPL-MCNC: 4.5 GM/DL (ref 3.4–5)
ALP BLD-CCNC: 65 IU/L (ref 40–128)
ALT SERPL-CCNC: 33 U/L (ref 10–40)
ANION GAP SERPL CALCULATED.3IONS-SCNC: 11 MMOL/L (ref 4–16)
AST SERPL-CCNC: 21 IU/L (ref 15–37)
BASOPHILS ABSOLUTE: 0.1 K/CU MM
BASOPHILS RELATIVE PERCENT: 0.8 % (ref 0–1)
BILIRUB SERPL-MCNC: 0.4 MG/DL (ref 0–1)
BUN BLDV-MCNC: 17 MG/DL (ref 6–23)
CALCIUM SERPL-MCNC: 9.2 MG/DL (ref 8.3–10.6)
CHLORIDE BLD-SCNC: 102 MMOL/L (ref 99–110)
CO2: 26 MMOL/L (ref 21–32)
CREAT SERPL-MCNC: 1 MG/DL (ref 0.9–1.3)
DIFFERENTIAL TYPE: ABNORMAL
EOSINOPHILS ABSOLUTE: 0.2 K/CU MM
EOSINOPHILS RELATIVE PERCENT: 2.2 % (ref 0–3)
GFR AFRICAN AMERICAN: >60 ML/MIN/1.73M2
GFR NON-AFRICAN AMERICAN: >60 ML/MIN/1.73M2
GLUCOSE BLD-MCNC: 106 MG/DL (ref 70–99)
HCT VFR BLD CALC: 48.3 % (ref 42–52)
HEMOGLOBIN: 15.7 GM/DL (ref 13.5–18)
IMMATURE NEUTROPHIL %: 0.2 % (ref 0–0.43)
LYMPHOCYTES ABSOLUTE: 2.3 K/CU MM
LYMPHOCYTES RELATIVE PERCENT: 26.6 % (ref 24–44)
MCH RBC QN AUTO: 28.6 PG (ref 27–31)
MCHC RBC AUTO-ENTMCNC: 32.5 % (ref 32–36)
MCV RBC AUTO: 88.1 FL (ref 78–100)
MONOCYTES ABSOLUTE: 0.8 K/CU MM
MONOCYTES RELATIVE PERCENT: 9 % (ref 0–4)
NUCLEATED RBC %: 0 %
PDW BLD-RTO: 13.2 % (ref 11.7–14.9)
PLATELET # BLD: 343 K/CU MM (ref 140–440)
PMV BLD AUTO: 8.9 FL (ref 7.5–11.1)
POTASSIUM SERPL-SCNC: 4.7 MMOL/L (ref 3.5–5.1)
PRO-BNP: 850.4 PG/ML
RBC # BLD: 5.48 M/CU MM (ref 4.6–6.2)
SEGMENTED NEUTROPHILS ABSOLUTE COUNT: 5.3 K/CU MM
SEGMENTED NEUTROPHILS RELATIVE PERCENT: 61.2 % (ref 36–66)
SODIUM BLD-SCNC: 139 MMOL/L (ref 135–145)
T4 FREE: 1.09 NG/DL (ref 0.9–1.8)
TOTAL IMMATURE NEUTOROPHIL: 0.02 K/CU MM
TOTAL NUCLEATED RBC: 0 K/CU MM
TOTAL PROTEIN: 7 GM/DL (ref 6.4–8.2)
TSH HIGH SENSITIVITY: 1.8 UIU/ML (ref 0.27–4.2)
WBC # BLD: 8.7 K/CU MM (ref 4–10.5)

## 2021-01-21 PROCEDURE — 85025 COMPLETE CBC W/AUTO DIFF WBC: CPT

## 2021-01-21 PROCEDURE — 84443 ASSAY THYROID STIM HORMONE: CPT

## 2021-01-21 PROCEDURE — 36415 COLL VENOUS BLD VENIPUNCTURE: CPT

## 2021-01-21 PROCEDURE — 84439 ASSAY OF FREE THYROXINE: CPT

## 2021-01-21 PROCEDURE — 83880 ASSAY OF NATRIURETIC PEPTIDE: CPT

## 2021-01-21 PROCEDURE — 80053 COMPREHEN METABOLIC PANEL: CPT

## 2021-01-25 ENCOUNTER — HOSPITAL ENCOUNTER (OUTPATIENT)
Age: 71
Discharge: HOME | End: 2021-01-25
Payer: COMMERCIAL

## 2021-01-25 ENCOUNTER — HOSPITAL ENCOUNTER (OUTPATIENT)
Dept: LAB | Age: 71
Discharge: HOME | End: 2021-01-25
Payer: COMMERCIAL

## 2021-01-25 LAB
ANION GAP SERPL CALCULATED.3IONS-SCNC: 11 MMOL/L (ref 4–16)
APTT: 26.2 SECONDS (ref 25.1–37.1)
BASOPHILS ABSOLUTE: 0.1 K/CU MM
BASOPHILS RELATIVE PERCENT: 0.8 % (ref 0–1)
BUN BLDV-MCNC: 15 MG/DL (ref 6–23)
CALCIUM SERPL-MCNC: 9 MG/DL (ref 8.3–10.6)
CHLORIDE BLD-SCNC: 104 MMOL/L (ref 99–110)
CO2: 25 MMOL/L (ref 21–32)
CREAT SERPL-MCNC: 1 MG/DL (ref 0.9–1.3)
DIFFERENTIAL TYPE: ABNORMAL
EOSINOPHILS ABSOLUTE: 0.3 K/CU MM
EOSINOPHILS RELATIVE PERCENT: 3.6 % (ref 0–3)
GFR AFRICAN AMERICAN: >60 ML/MIN/1.73M2
GFR NON-AFRICAN AMERICAN: >60 ML/MIN/1.73M2
GLUCOSE BLD-MCNC: 104 MG/DL (ref 70–99)
HCT VFR BLD CALC: 44.1 % (ref 42–52)
HEMOGLOBIN: 14.5 GM/DL (ref 13.5–18)
IMMATURE NEUTROPHIL %: 0.2 % (ref 0–0.43)
INR BLD: 0.97 INDEX
LYMPHOCYTES ABSOLUTE: 2 K/CU MM
LYMPHOCYTES RELATIVE PERCENT: 22.4 % (ref 24–44)
MCH RBC QN AUTO: 29 PG (ref 27–31)
MCHC RBC AUTO-ENTMCNC: 32.9 % (ref 32–36)
MCV RBC AUTO: 88.2 FL (ref 78–100)
MONOCYTES ABSOLUTE: 0.6 K/CU MM
MONOCYTES RELATIVE PERCENT: 7 % (ref 0–4)
NUCLEATED RBC %: 0 %
PDW BLD-RTO: 13.1 % (ref 11.7–14.9)
PLATELET # BLD: 321 K/CU MM (ref 140–440)
PMV BLD AUTO: 8.7 FL (ref 7.5–11.1)
POTASSIUM SERPL-SCNC: 5 MMOL/L (ref 3.5–5.1)
PROTHROMBIN TIME: 11.7 SECONDS (ref 11.7–14.5)
RBC # BLD: 5 M/CU MM (ref 4.6–6.2)
SEGMENTED NEUTROPHILS ABSOLUTE COUNT: 5.8 K/CU MM
SEGMENTED NEUTROPHILS RELATIVE PERCENT: 66 % (ref 36–66)
SODIUM BLD-SCNC: 140 MMOL/L (ref 135–145)
TOTAL IMMATURE NEUTOROPHIL: 0.02 K/CU MM
TOTAL NUCLEATED RBC: 0 K/CU MM
WBC # BLD: 8.8 K/CU MM (ref 4–10.5)

## 2021-01-25 PROCEDURE — 36415 COLL VENOUS BLD VENIPUNCTURE: CPT

## 2021-01-25 PROCEDURE — U0002 COVID-19 LAB TEST NON-CDC: HCPCS

## 2021-01-25 PROCEDURE — 85730 THROMBOPLASTIN TIME PARTIAL: CPT

## 2021-01-25 PROCEDURE — 85025 COMPLETE CBC W/AUTO DIFF WBC: CPT

## 2021-01-25 PROCEDURE — 80048 BASIC METABOLIC PNL TOTAL CA: CPT

## 2021-01-25 PROCEDURE — C9803 HOPD COVID-19 SPEC COLLECT: HCPCS

## 2021-01-25 PROCEDURE — 85610 PROTHROMBIN TIME: CPT

## 2021-01-26 LAB
SARS-COV-2: NOT DETECTED
SOURCE: NORMAL

## 2021-01-28 ENCOUNTER — HOSPITAL ENCOUNTER (OUTPATIENT)
Dept: NON INVASIVE DIAGNOSTICS | Age: 71
Discharge: HOME | End: 2021-01-28
Payer: COMMERCIAL

## 2021-01-28 VITALS
RESPIRATION RATE: 17 BRPM | HEART RATE: 84 BPM | OXYGEN SATURATION: 94 % | SYSTOLIC BLOOD PRESSURE: 99 MMHG | DIASTOLIC BLOOD PRESSURE: 64 MMHG | TEMPERATURE: 98.4 F

## 2021-01-28 LAB
EKG ATRIAL RATE: 322 BPM
EKG ATRIAL RATE: 76 BPM
EKG DIAGNOSIS: NORMAL
EKG DIAGNOSIS: NORMAL
EKG P AXIS: 55 DEGREES
EKG P AXIS: 83 DEGREES
EKG P-R INTERVAL: 252 MS
EKG Q-T INTERVAL: 362 MS
EKG Q-T INTERVAL: 376 MS
EKG QRS DURATION: 70 MS
EKG QRS DURATION: 84 MS
EKG QTC CALCULATION (BAZETT): 423 MS
EKG QTC CALCULATION (BAZETT): 459 MS
EKG R AXIS: -7 DEGREES
EKG R AXIS: 32 DEGREES
EKG T AXIS: 46 DEGREES
EKG T AXIS: 77 DEGREES
EKG VENTRICULAR RATE: 76 BPM
EKG VENTRICULAR RATE: 97 BPM
LV EF: 55 %
LVEF MODALITY: NORMAL

## 2021-01-28 PROCEDURE — 92960 CARDIOVERSION ELECTRIC EXT: CPT

## 2021-01-28 PROCEDURE — 7100000001 HC PACU RECOVERY - ADDTL 15 MIN

## 2021-01-28 PROCEDURE — 93306 TTE W/DOPPLER COMPLETE: CPT

## 2021-01-28 PROCEDURE — 93005 ELECTROCARDIOGRAM TRACING: CPT | Performed by: INTERNAL MEDICINE

## 2021-01-28 PROCEDURE — 6360000002 HC RX W HCPCS: Performed by: INTERNAL MEDICINE

## 2021-01-28 PROCEDURE — 93312 ECHO TRANSESOPHAGEAL: CPT

## 2021-01-28 PROCEDURE — 7100000000 HC PACU RECOVERY - FIRST 15 MIN

## 2021-01-28 PROCEDURE — 93010 ELECTROCARDIOGRAM REPORT: CPT | Performed by: INTERNAL MEDICINE

## 2021-01-28 RX ADMIN — ENOXAPARIN SODIUM 100 MG: 100 INJECTION SUBCUTANEOUS at 09:45

## 2021-01-28 NOTE — PROCEDURES
[address], [address] [place] [address]                                 ECHOCARDIOGRAM    PATIENT NAME: Evan Henderson                    :        1950  MED REC NO:   [id number]                          ROOM:  ACCOUNT NO:   [de-identified]                           ADMIT DATE: 2021  PROVIDER:     Addison Arriaga MD    INDICATION:  Atrial flutter. A 73-year-old male patient brought to the noninvasive lab today. The  patient received 6 mg of Versed and 75 mcg of fentanyl. The posterior  pharynx was anesthetized using lidocaine viscous gel. A mouth guard was  placed. ZACK probe was advanced to the posterior pharynx and mid  esophagus. Images were obtained. FINDINGS:  Left atrium is moderately enlarged. Spontaneous echodensity  noted, but no clot or thrombus noted in the left atrium, left atrial  appendage. The rate is very fast.  I was unable to evaluate mitral  valve. There appears to possibly some mitral valve prolapse and  moderate mitral regurgitation present. LV function appears to be  preserved. EF is around 50% to 55%. No pericardial effusion noted. No  ASD or PFO was noted. Color Doppler and bubble study both were  negative. Moderate tricuspid regurgitation noted and trace aortic  regurgitation. Aortic valve is trileaflet, pulmonic is grossly normal.   Ascending aorta is normal.    IMPRESSION:  1. Spontaneous echodensity noted in the left atrium, but no clot or  thrombus noted in the left atrium, left atrial appendage or LV cavity. 2.  Moderate mitral regurgitation present and some possible prolapse  present. We will get a transesophageal echo once he is cardioverted and  trace aortic regurgitation noted. No ASD or PFO was noted. Moderate  tricuspid regurgitation present. The ZACK probe was removed. There were no complications. The patient was proceeded with the cardioversion.   The patient was  successfully cardioverted from atrial flutter to sinus rhythm with one  shock of 200 joules. IMPRESSION:  Successful cardioversion from atrial flutter to sinus  rhythm. One shock of 200 joules. The patient's left atrium is moderately enlarged and appears moderate  mitral regurgitation present. I think that needs to be evaluated  closely to see the etiology of the atrial fibrillation. The patient will be started on anticoagulation. I will start him on  amiodarone also with beta blockers.         Carol Cota MD    D: 01/28/2021 10:04:10       T: 01/28/2021 11:54:17     [id number]  Job#: [id number]     Doc#: [id number]    CC:

## 2021-01-28 NOTE — H&P
[address] [place] [address], [address] [place] [address]                              HISTORY AND PHYSICAL    PATIENT NAME: Ladi Murcia                    :        1950  MED REC NO:   [id number]                          ROOM:  ACCOUNT NO:   [de-identified]                           ADMIT DATE: 2021  PROVIDER:     Linus Mirza MD    INDICATION:  Atrial fibrillation and flutter. HISTORY OF PRESENT ILLNESS:  This is a 55-year-old male patient, patient  of Dr. Devin Hess. He is here for ZACK-guided cardioversion. This is a 55-year-old male patient with history of having paroxysmal  atrial fibrillation for the last 4 to 6 weeks. The patient was on  anticoagulation in the past, but he has had bleeding of the eyes and  then stopped taking the blood thinners because of the bleeding issues  and now he is back in flutter, therefore he is here for ZACK guided  cardioversion. The patient will be started on anticoagulation. The  patient denies any chest pain. No shortness of breath. The patient is  relatively active. No other  or GI complaints present. MEDICATIONS:  He is on Coenzyme Q, aspirin, metoprolol 100 b.i.d.,  multivitamins and _____. PAST MEDICAL HISTORY:  Atrial flutter and history of having back pain,  hypertension, hyperlipidemia and BPH present. PAST SURGICAL HISTORY:  Back surgery, bilateral partial knee  replacement, hernia repair. SOCIAL HISTORY:  He does not smoke, does not drink. ALLERGIES:  NKDA. PHYSICAL EXAMINATION:  GENERAL:  The patient is awake, alert and answering questions, not in  acute distress. VITAL SIGNS:  Temperature is _____, blood pressure 161/100. HEENT:  Normocephalic and atraumatic. Pupils are equal and reactive. CHEST:  Equal expansion. HEART:  Irregularly irregular. ABDOMEN:  Soft and nontender. Bowel sounds are present.   No  hepatosplenomegaly or guarding appreciated. EXTREMITIES:  No cyanosis or clubbing noted. NEUROLOGIC:  Cranial nerves II through XII are grossly intact. The patient had a stress test in 2018, was negative for ischemia. IMPRESSION:  This is a 77-year-old male patient who is in atrial flutter  with rapid ventricular response present. His beta-blockers have been  increased, but the rate is still elevated. We will plan for ZACK  cardioversion; however, discussed the risks and benefits of  anticoagulation. We will give him dose of Lovenox 1 mg per kilogram one  dose and start him on Xarelto. The patient is to pickup the samples of  Xarelto from the office today, minimum four to six weeks of  anticoagulation and maybe he will be candidate for ablation or also  probably a candidate for atrial appendage occluded device in the future  if his AFib is unable to tolerate anticoagulation. I think he will  probably also need a workup for his ischemia once he is back in sinus  rhythm as his last stress test was in 2018. I discussed with the  patient, he agrees to take his anticoagulation for at least four to six  weeks. ASA is 3 and Mallampati is 3.         Marcus Nino MD    D: 01/28/2021 9:49:54       T: 01/28/2021 10:57:14     [id number]  Job#: [id number]     Doc#: [id number]    CC:

## 2021-03-01 ENCOUNTER — OFFICE VISIT (OUTPATIENT)
Dept: INTERNAL MEDICINE CLINIC | Age: 71
End: 2021-03-01
Payer: COMMERCIAL

## 2021-03-01 VITALS
OXYGEN SATURATION: 96 % | DIASTOLIC BLOOD PRESSURE: 78 MMHG | TEMPERATURE: 96.9 F | WEIGHT: 195.8 LBS | HEART RATE: 66 BPM | BODY MASS INDEX: 26.56 KG/M2 | SYSTOLIC BLOOD PRESSURE: 136 MMHG

## 2021-03-01 DIAGNOSIS — I48.0 PAF (PAROXYSMAL ATRIAL FIBRILLATION) (HCC): ICD-10-CM

## 2021-03-01 DIAGNOSIS — R97.20 ELEVATED PSA: ICD-10-CM

## 2021-03-01 DIAGNOSIS — I10 ESSENTIAL HYPERTENSION: Primary | ICD-10-CM

## 2021-03-01 PROCEDURE — 99213 OFFICE O/P EST LOW 20 MIN: CPT | Performed by: FAMILY MEDICINE

## 2021-03-01 RX ORDER — METOPROLOL TARTRATE 100 MG/1
1 TABLET ORAL 2 TIMES DAILY
COMMUNITY
Start: 2021-01-21

## 2021-03-01 RX ORDER — ASPIRIN 325 MG
325 TABLET ORAL EVERY MORNING
COMMUNITY
End: 2021-05-17

## 2021-03-01 ASSESSMENT — ENCOUNTER SYMPTOMS
BACK PAIN: 0
CONSTIPATION: 0
ABDOMINAL PAIN: 0
CHEST TIGHTNESS: 0
SHORTNESS OF BREATH: 0
NAUSEA: 0
COUGH: 0
DIARRHEA: 0

## 2021-03-01 ASSESSMENT — PATIENT HEALTH QUESTIONNAIRE - PHQ9
1. LITTLE INTEREST OR PLEASURE IN DOING THINGS: 0
SUM OF ALL RESPONSES TO PHQ9 QUESTIONS 1 & 2: 0

## 2021-03-01 NOTE — PROGRESS NOTES
Svitlana Pang  1950  66 y.o.  male    Chief Complaint   Patient presents with    Follow-up     5 months    Hypertension         History of Present Illness  Svitlana Pang is a 66 y.o. male who presents today for a check up. Patient Active Problem List   Diagnosis    Essential hypertension    Mixed hyperlipidemia    Primary osteoarthritis of both knees    Status post left knee replacement    Elevated PSA    PAF (paroxysmal atrial fibrillation) ([address]     -Pt sates he had A fibrillation for several weeks. He was seen by cardiology for cardioversion. He states the Metoprolol was increased. He refused anticoagulation with Xarelto. He is on aspirin 325  -Elevated PSA- refused to recheck lab at this time. He is aware of the risks  -HTN- Stable    Review of Systems   Constitutional: Negative for diaphoresis and fever. Respiratory: Negative for cough, chest tightness and shortness of breath. Cardiovascular: Negative for chest pain and palpitations. Gastrointestinal: Negative for abdominal pain, constipation, diarrhea and nausea. Genitourinary: Negative for dysuria and flank pain. Musculoskeletal: Negative for back pain. Neurological: Negative for dizziness and headaches. Psychiatric/Behavioral: Negative for dysphoric mood.        No Known Allergies    Past Medical History:   Diagnosis Date    Arthritis     Back injuries     Elevated PSA     Hyperlipidemia     Hypertension     PAF (paroxysmal atrial fibrillation) (HCC)        Past Surgical History:   Procedure Laterality Date    BACK SURGERY      COLONOSCOPY  6-13-14    4 polyps, Dr. Chinmay Barnes      right side of neck    TOTAL KNEE ARTHROPLASTY Left 02/2017    TOTAL KNEE ARTHROPLASTY Right 12/2017    Per Dr. Priscila Curtis       Family History   Problem Relation Age of Onset    Cancer Father     Diabetes Mother        Social History     Tobacco Use    Smoking status: Never Smoker    Smokeless tobacco: Never Used   Substance Use Topics    Alcohol use: Not Currently     Alcohol/week: 0.0 standard drinks     Comment: quit now    Drug use: Yes     Types: Marijuana       Current Outpatient Medications   Medication Sig Dispense Refill    metoprolol (LOPRESSOR) 100 MG tablet Take 1 tablet by mouth 2 times daily       aspirin 325 MG tablet Take 325 mg by mouth every morning      Ubiquinol (QUNOL COQ10/UBIQUINOL/SILAS) 100 MG CAPS Take by mouth      Misc Natural Products (PROSTATE SUPPORT PO) Take by mouth      Multiple Vitamins-Minerals (THERAPEUTIC MULTIVITAMIN-MINERALS) tablet Take 1 tablet by mouth daily. No current facility-administered medications for this visit. OBJECTIVE:    /78   Pulse 66   Temp 96.9 °F (36.1 °C)   Wt 195 lb 12.8 oz (88.8 kg)   SpO2 96%   BMI 26.56 kg/m²     Physical Exam  Vitals signs reviewed. Constitutional:       General: He is not in acute distress. Eyes:      Conjunctiva/sclera: Conjunctivae normal.   Neck:      Musculoskeletal: Neck supple. Cardiovascular:      Rate and Rhythm: Normal rate and regular rhythm. Pulmonary:      Effort: Pulmonary effort is normal. No respiratory distress. Breath sounds: Normal breath sounds. Abdominal:      General: Bowel sounds are normal.      Palpations: Abdomen is soft. Tenderness: There is no abdominal tenderness. Musculoskeletal:      Right lower leg: No edema. Left lower leg: No edema. Neurological:      Mental Status: He is alert and oriented to person, place, and time. Cranial Nerves: No cranial nerve deficit. Psychiatric:         Mood and Affect: Mood normal.         ASSESSMENT:  1. Essential hypertension    2.  PAF (paroxysmal atrial fibrillation) (HCC)    3. Elevated PSA        PLAN:  Continue medications  Pt refused lab recheck at this time  Keep f/u with cardiology         Return in about 1 year (around 3/1/2022) for physical.    Electronically Signed by Naren Crenshaw DO

## 2021-05-17 ENCOUNTER — OFFICE VISIT (OUTPATIENT)
Dept: INTERNAL MEDICINE CLINIC | Age: 71
End: 2021-05-17
Payer: COMMERCIAL

## 2021-05-17 VITALS
DIASTOLIC BLOOD PRESSURE: 70 MMHG | BODY MASS INDEX: 26.47 KG/M2 | TEMPERATURE: 96.7 F | HEART RATE: 84 BPM | WEIGHT: 195.2 LBS | SYSTOLIC BLOOD PRESSURE: 130 MMHG | OXYGEN SATURATION: 99 %

## 2021-05-17 DIAGNOSIS — L25.5 RHUS DERMATITIS: Primary | ICD-10-CM

## 2021-05-17 DIAGNOSIS — L29.9 PRURITUS: ICD-10-CM

## 2021-05-17 PROCEDURE — 99213 OFFICE O/P EST LOW 20 MIN: CPT | Performed by: FAMILY MEDICINE

## 2021-05-17 RX ORDER — PREDNISONE 10 MG/1
TABLET ORAL
Qty: 15 TABLET | Refills: 0 | Status: SHIPPED | OUTPATIENT
Start: 2021-05-17 | End: 2022-03-01

## 2021-05-17 SDOH — ECONOMIC STABILITY: FOOD INSECURITY: WITHIN THE PAST 12 MONTHS, THE FOOD YOU BOUGHT JUST DIDN'T LAST AND YOU DIDN'T HAVE MONEY TO GET MORE.: NEVER TRUE

## 2021-05-17 SDOH — ECONOMIC STABILITY: TRANSPORTATION INSECURITY
IN THE PAST 12 MONTHS, HAS THE LACK OF TRANSPORTATION KEPT YOU FROM MEDICAL APPOINTMENTS OR FROM GETTING MEDICATIONS?: NO

## 2021-05-17 ASSESSMENT — ENCOUNTER SYMPTOMS
COUGH: 0
ABDOMINAL PAIN: 0
SHORTNESS OF BREATH: 0
CHEST TIGHTNESS: 0
BACK PAIN: 0
NAUSEA: 0

## 2021-05-17 ASSESSMENT — SOCIAL DETERMINANTS OF HEALTH (SDOH): HOW HARD IS IT FOR YOU TO PAY FOR THE VERY BASICS LIKE FOOD, HOUSING, MEDICAL CARE, AND HEATING?: NOT HARD AT ALL

## 2021-05-17 NOTE — PROGRESS NOTES
Jojo Mcgregor  1950  66 y.o.  male    Chief Complaint   Patient presents with   [facility]     both arms x 1 week ago         History of Present Illness  Jojo Mcgregor is a 66 y.o. male who presents today for a c/o of poison ivy for 1 week. Pt has noticed to his arms b/l. +Itchy. He has used Triamcinolone ointment, Calamine and Neosporin. He feels like the symptoms have not improved. No Cp or Sob. Review of Systems   Constitutional: Negative for diaphoresis and fever. Respiratory: Negative for cough, chest tightness and shortness of breath. Cardiovascular: Negative for chest pain and palpitations. Gastrointestinal: Negative for abdominal pain and nausea. Genitourinary: Negative for difficulty urinating. Musculoskeletal: Negative for back pain. Skin: Positive for rash. Neurological: Negative for dizziness and headaches.        No Known Allergies    Past Medical History:   Diagnosis Date    Arthritis     Back injuries     Elevated PSA     Hyperlipidemia     Hypertension     PAF (paroxysmal atrial fibrillation) (HCC)        Past Surgical History:   Procedure Laterality Date    BACK SURGERY      COLONOSCOPY  6-13-14    4 polyps, Dr. Marta Medina      right side of neck    TOTAL KNEE ARTHROPLASTY Left 02/2017    TOTAL KNEE ARTHROPLASTY Right 12/2017    Per Dr. Vincent Silva       Family History   Problem Relation Age of Onset    Cancer Father     Diabetes Mother        Social History     Tobacco Use    Smoking status: Never Smoker    Smokeless tobacco: Never Used   Substance Use Topics    Alcohol use: Not Currently     Alcohol/week: 0.0 standard drinks     Comment: quit now    Drug use: Yes     Types: Marijuana       Current Outpatient Medications   Medication Sig Dispense Refill    predniSONE (DELTASONE) 10 MG tablet Take 5 tablets by mouth day 1, 4 tablets by mouth day 2, 3 tablets by mouth day 3, 2 tablets by mouth day 4, 1 tablet by mouth day 5 15 tablet 0    metoprolol (LOPRESSOR) 100 MG tablet Take 1 tablet by mouth 2 times daily       Ubiquinol (QUNOL COQ10/UBIQUINOL/SILAS) 100 MG CAPS Take by mouth      Misc Natural Products (PROSTATE SUPPORT PO) Take by mouth      Multiple Vitamins-Minerals (THERAPEUTIC MULTIVITAMIN-MINERALS) tablet Take 1 tablet by mouth daily.  aspirin 325 MG tablet Take 325 mg by mouth every morning (Patient not taking: Reported on 5/17/2021)       No current facility-administered medications for this visit. OBJECTIVE:    /70   Pulse 84   Temp 96.7 °F (35.9 °C)   Wt 195 lb 3.2 oz (88.5 kg)   SpO2 99%   BMI 26.47 kg/m²     Physical Exam  Vitals reviewed. Constitutional:       General: He is not in acute distress. Eyes:      Conjunctiva/sclera: Conjunctivae normal.   Cardiovascular:      Rate and Rhythm: Normal rate and regular rhythm. Pulmonary:      Effort: Pulmonary effort is normal. No respiratory distress. Breath sounds: Normal breath sounds. Abdominal:      General: Bowel sounds are normal.      Palpations: Abdomen is soft. Tenderness: There is no abdominal tenderness. Musculoskeletal:      Cervical back: Neck supple. Right lower leg: No edema. Left lower leg: No edema. Skin:     Findings: Rash present. Neurological:      Mental Status: He is alert and oriented to person, place, and time. Cranial Nerves: No cranial nerve deficit. Psychiatric:         Mood and Affect: Mood normal.         ASSESSMENT:  1. Rhus dermatitis    2. Pruritus        PLAN:    Orders Placed This Encounter   Medications    predniSONE (DELTASONE) 10 MG tablet     Sig: Take 5 tablets by mouth day 1, 4 tablets by mouth day 2, 3 tablets by mouth day 3, 2 tablets by mouth day 4, 1 tablet by mouth day 5     Dispense:  15 tablet     Refill:  0   Start med  ADR's explained. Pt understands  Persist RTO or call           Return if symptoms worsen or fail to improve.     Electronically Signed by Charis Eisenmenger, DO

## 2021-10-29 ENCOUNTER — HOSPITAL ENCOUNTER (OUTPATIENT)
Age: 71
Discharge: HOME | End: 2021-10-29
Payer: COMMERCIAL

## 2021-10-29 ENCOUNTER — HOSPITAL ENCOUNTER (OUTPATIENT)
Dept: GENERAL RADIOLOGY | Age: 71
Discharge: HOME | End: 2021-10-29
Payer: COMMERCIAL

## 2021-10-29 DIAGNOSIS — R06.02 SHORTNESS OF BREATH: ICD-10-CM

## 2021-10-29 LAB
ALBUMIN SERPL-MCNC: 4.3 GM/DL (ref 3.4–5)
ALP BLD-CCNC: 66 IU/L (ref 40–129)
ALT SERPL-CCNC: 31 U/L (ref 10–40)
AST SERPL-CCNC: 19 IU/L (ref 15–37)
BILIRUB SERPL-MCNC: 0.3 MG/DL (ref 0–1)
BILIRUBIN DIRECT: 0.2 MG/DL (ref 0–0.3)
BILIRUBIN, INDIRECT: 0.1 MG/DL (ref 0–0.7)
T4 FREE: 1.11 NG/DL (ref 0.9–1.8)
TOTAL PROTEIN: 6.4 GM/DL (ref 6.4–8.2)
TSH HIGH SENSITIVITY: 2.03 UIU/ML (ref 0.27–4.2)

## 2021-10-29 PROCEDURE — 84443 ASSAY THYROID STIM HORMONE: CPT

## 2021-10-29 PROCEDURE — 84439 ASSAY OF FREE THYROXINE: CPT

## 2021-10-29 PROCEDURE — 80076 HEPATIC FUNCTION PANEL: CPT

## 2021-10-29 PROCEDURE — 71046 X-RAY EXAM CHEST 2 VIEWS: CPT

## 2021-10-29 PROCEDURE — 36415 COLL VENOUS BLD VENIPUNCTURE: CPT

## 2021-12-27 ENCOUNTER — VIRTUAL VISIT (OUTPATIENT)
Dept: INTERNAL MEDICINE CLINIC | Age: 71
End: 2021-12-27
Payer: COMMERCIAL

## 2021-12-27 DIAGNOSIS — Z00.00 ROUTINE GENERAL MEDICAL EXAMINATION AT A HEALTH CARE FACILITY: Primary | ICD-10-CM

## 2021-12-27 PROCEDURE — G0439 PPPS, SUBSEQ VISIT: HCPCS | Performed by: FAMILY MEDICINE

## 2021-12-27 RX ORDER — AMIODARONE HYDROCHLORIDE 200 MG/1
100 TABLET ORAL DAILY
COMMUNITY

## 2021-12-27 ASSESSMENT — PATIENT HEALTH QUESTIONNAIRE - PHQ9
1. LITTLE INTEREST OR PLEASURE IN DOING THINGS: 1
SUM OF ALL RESPONSES TO PHQ QUESTIONS 1-9: 1
SUM OF ALL RESPONSES TO PHQ9 QUESTIONS 1 & 2: 1
1. LITTLE INTEREST OR PLEASURE IN DOING THINGS: 1
2. FEELING DOWN, DEPRESSED OR HOPELESS: 0
SUM OF ALL RESPONSES TO PHQ9 QUESTIONS 1 & 2: 1
SUM OF ALL RESPONSES TO PHQ QUESTIONS 1-9: 1
2. FEELING DOWN, DEPRESSED OR HOPELESS: 0
SUM OF ALL RESPONSES TO PHQ QUESTIONS 1-9: 1

## 2021-12-27 ASSESSMENT — LIFESTYLE VARIABLES
HOW OFTEN DO YOU HAVE A DRINK CONTAINING ALCOHOL: 0
AUDIT TOTAL SCORE: INCOMPLETE
AUDIT-C TOTAL SCORE: INCOMPLETE
HOW OFTEN DO YOU HAVE A DRINK CONTAINING ALCOHOL: 0
HOW OFTEN DO YOU HAVE A DRINK CONTAINING ALCOHOL: NEVER

## 2021-12-27 NOTE — PROGRESS NOTES
Medicare Annual Wellness Visit  Name: Oleg Weinstein Date: 2021   MRN: [id number] Sex: Male   Age: 66 y.o. Ethnicity: Non- / Non    : 1950 Race: White (non-)      Myrtle Segundo is here for Medicare AWV    Screenings for behavioral, psychosocial and functional/safety risks, and cognitive dysfunction are all negative except as indicated below. These results, as well as other patient data from the [address] form, are documented in Flowsheets linked to this Encounter. No Known Allergies    Prior to Visit Medications    Medication Sig Taking? Authorizing Provider   amiodarone (CORDARONE) 200 MG tablet Take 200 mg by mouth daily Yes Jorge Gallardo MD   metoprolol (LOPRESSOR) 100 MG tablet Take 1 tablet by mouth 2 times daily  Yes Historical Provider, MD   Ubiquinol (QUNOL COQ10/UBIQUINOL/SILAS) 100 MG CAPS Take by mouth Yes Historical Provider, MD   Misc Natural Products (PROSTATE SUPPORT PO) Take by mouth Yes Historical Provider, MD   Multiple Vitamins-Minerals (THERAPEUTIC MULTIVITAMIN-MINERALS) tablet Take 1 tablet by mouth daily.  Yes Historical Provider, MD   predniSONE (DELTASONE) 10 MG tablet Take 5 tablets by mouth day 1, 4 tablets by mouth day 2, 3 tablets by mouth day 3, 2 tablets by mouth day 4, 1 tablet by mouth day 5  Patient not taking: Reported on 2021  Lazaro Nunes DO       Past Medical History:   Diagnosis Date    Arthritis     Back injuries     Elevated PSA     Hyperlipidemia     Hypertension     PAF (paroxysmal atrial fibrillation) [facility])        Past Surgical History:   Procedure Laterality Date    BACK SURGERY      COLONOSCOPY  -    4 polyps, Dr. Milli Manning      right side of neck    TOTAL KNEE ARTHROPLASTY Left 2017    TOTAL KNEE ARTHROPLASTY Right 2017    Per Dr. Ana Rabago       Family History   Problem Relation Age of Onset    Pancreatic Cancer Father     Diabetes Mother  No Known Problems Brother     No Known Problems Sister     No Known Problems Sister     No Known Problems Brother        CareTeam (Including outside providers/suppliers regularly involved in providing care):   Patient Care Team:  Lazaro Nunes DO as PCP - General (Family Medicine)  Lazaro Nunes DO as PCP - [facility] Provider  Jorge Gallardo MD as Consulting Physician (Cardiology)    Wt Readings from Last 3 Encounters:   05/17/21 195 lb 3.2 oz (88.5 kg)   03/01/21 195 lb 12.8 oz (88.8 kg)   07/31/20 193 lb 3.2 oz (87.6 kg)     There were no vitals filed for this visit. There is no height or weight on file to calculate BMI. Based upon direct observation of the patient, evaluation of cognition reveals remote memory intact, recent memory impaired. Patient's complete Health Risk Assessment and screening values have been reviewed and are found in Flowsheets. The following problems were reviewed today and where indicated follow up appointments were made and/or referrals ordered. Positive Risk Factor Screenings with Interventions:      Cognitive: Words recalled: 2 Words Recalled  Total Score Interpretation: Positive Mini-Cog  Did the patient refuse to take the cognition test?: No  Cognitive Impairment Interventions:  · Patient declines any further evaluation/treatment for cognitive impairment   · Patient did not sound as if he was cognitively impaired      Substance History:  Social History     Tobacco History     Smoking Status  Never Smoker    Smokeless Tobacco Use  Never Used          Alcohol History     Alcohol Use Status  Not Currently Drinks/Week  0 Standard drinks or equivalent per week Amount  0.0 standard drinks of alcohol/wk Comment  quit now          Drug Use     Drug Use Status  Yes Types  Marijuana (Weed)          Sexual Activity     Sexually Active  Yes Partners  Female               Alcohol Screening:       A score of 8 or more is associated with harmful or hazardous drinking.  A score of 13 or more in women, and 15 or more in men, is likely to indicate alcohol dependence. Substance Abuse Interventions:  · Recreational drug use:  failed to ask    General Health and ACP:  General  In general, how would you say your health is?: Good  In the past 7 days, have you experienced any of the following?  New or Increased Pain, New or Increased Fatigue, Loneliness, Social Isolation, Stress or Anger?: (!) New or Increased Fatigue (possibly new med)  Do you get the social and emotional support that you need?: Yes  Do you have a Living Will?: Yes  Advance Directives     Power of  Living Will ACP-Advance Directive ACP-Power of     Not on File Not on File Not on File Not on File      General Health Risk Interventions:  · Fatigue: Patient will call cardiologist today to let them know of his fatigue issues since they prescribed him amiodarone  · No Living Will: ACP documents already completed- patient asked to provide copy to the office    Health Habits/Nutrition:  Health Habits/Nutrition  Do you exercise for at least 20 minutes 2-3 times per week?: (!) No  Have you lost any weight without trying in the past 3 months?: No  Do you eat only one meal per day?: No  Have you seen the dentist within the past year?: Yes     Health Habits/Nutrition Interventions:  · Inadequate physical activity:  patient is not ready to increase his/her physical activity level at this time    Hearing/Vision:  No exam data present  Hearing/Vision  Do you or your family notice any trouble with your hearing that hasn't been managed with hearing aids?: No  Do you have difficulty driving, watching TV, or doing any of your daily activities because of your eyesight?: No  Have you had an eye exam within the past year?: (!) No  Hearing/Vision Interventions:  · Vision concerns:  patient encouraged to make appointment with his/her eye specialist, states he usually goes every other year      Personalized Preventive Plan   Current Health Maintenance Status  Immunization History   Administered Date(s) Administered    Tdap (Boostrix, Adacel) 06/18/2015, 07/21/2020        Health Maintenance   Topic Date Due    Annual Wellness Visit (AWV)  Never done    A1C test (Diabetic or Prediabetic)  01/07/2021    PSA counseling  01/07/2021    Flu vaccine (1) Never done    Shingles Vaccine (1 of 2) 03/01/2022 (Originally 2/5/1998)    Pneumococcal 65+ years Vaccine (1 of 1 - PPSV23) 03/01/2022 (Originally 2/5/2013)    COVID-19 Vaccine (1) 03/01/2022 (Originally 2/5/1955)    Potassium monitoring  01/25/2022    Creatinine monitoring  01/25/2022    TSH testing  10/29/2022    Lipid screen  01/22/2024    Colon cancer screen colonoscopy  06/28/2024    DTaP/Tdap/Td vaccine (3 - Td or Tdap) 07/21/2030    Hepatitis C screen  Completed    Hepatitis A vaccine  Aged Out    Hepatitis B vaccine  Aged Out    Hib vaccine  Aged Out    Meningococcal (ACWY) vaccine  Aged Out     Recommendations for [employer] Due: see orders and patient instructions/AVS. Patient states he doesn't get the flu vaccination. Unable to obtain 3 vital signs due to patient not having equipment to take blood pressure/temperature. Recommended screening schedule for the next 5-10 years is provided to the patient in written form: see Patient Instructions/AVS.    Di CARL LPN, 56/17/8286, performed the documented evaluation under the direct supervision of the attending physician. Mahamed Mistry, was evaluated through a synchronous (real-time) audio encounter. The patient (or guardian if applicable) is aware that this is a billable service. Verbal consent to proceed has been obtained within the past 12 months. The visit was conducted pursuant to the emergency declaration under the [facility], [address] [place] [address] authority and the [employer] Act.   Patient identification was verified, and a caregiver was present when appropriate. The patient was located in the state of [place], where the provider was credentialed to provide care. Total time spent for this encounter: Not billed by time    --Roger Wallis LPN on 56/22/9646 at 2:54 PM    An electronic signature was used to authenticate this note.

## 2022-03-01 ENCOUNTER — OFFICE VISIT (OUTPATIENT)
Dept: INTERNAL MEDICINE CLINIC | Age: 72
End: 2022-03-01
Payer: COMMERCIAL

## 2022-03-01 VITALS
SYSTOLIC BLOOD PRESSURE: 128 MMHG | HEIGHT: 72 IN | OXYGEN SATURATION: 96 % | HEART RATE: 59 BPM | DIASTOLIC BLOOD PRESSURE: 76 MMHG | WEIGHT: 195 LBS | TEMPERATURE: 97.7 F | BODY MASS INDEX: 26.41 KG/M2

## 2022-03-01 DIAGNOSIS — I48.0 PAF (PAROXYSMAL ATRIAL FIBRILLATION) (HCC): ICD-10-CM

## 2022-03-01 DIAGNOSIS — R73.9 HYPERGLYCEMIA: ICD-10-CM

## 2022-03-01 DIAGNOSIS — K62.5 RECTAL BLEEDING: Primary | ICD-10-CM

## 2022-03-01 DIAGNOSIS — E78.2 MIXED HYPERLIPIDEMIA: ICD-10-CM

## 2022-03-01 DIAGNOSIS — R53.83 FATIGUE, UNSPECIFIED TYPE: ICD-10-CM

## 2022-03-01 DIAGNOSIS — I10 ESSENTIAL HYPERTENSION: ICD-10-CM

## 2022-03-01 DIAGNOSIS — R97.20 ELEVATED PSA: ICD-10-CM

## 2022-03-01 PROCEDURE — 99214 OFFICE O/P EST MOD 30 MIN: CPT | Performed by: FAMILY MEDICINE

## 2022-03-01 RX ORDER — ASPIRIN 325 MG
325 TABLET ORAL DAILY
COMMUNITY

## 2022-03-01 ASSESSMENT — ENCOUNTER SYMPTOMS
ABDOMINAL PAIN: 0
BACK PAIN: 0
DIARRHEA: 0
NAUSEA: 0
COUGH: 0
SHORTNESS OF BREATH: 0
EYES NEGATIVE: 1
CONSTIPATION: 0

## 2022-03-01 NOTE — PROGRESS NOTES
Rahul Crook (:  1950) is a 74 y.o. male,Established patient, here for evaluation of the following chief complaint(s):  H&P, Hypertension, and Other         ASSESSMENT/PLAN:  1. Rectal bleeding- intermittent  -     Dieudonne Garcia MD, Gastroenterology, [place]  2. Fatigue, unspecified type  -     CBC with Auto Differential; Future  3. Essential hypertension, chronic  -     Basic Metabolic Panel; Future  4. Elevated PSA  -     PSA, Prostatic Specific Antigen; Future  5. PAF (paroxysmal atrial fibrillation) ([address] chronic  Continue medications  Keep f/u with cardiology  6. Hyperglycemia  -     Hemoglobin A1C; Future  7. Mixed hyperlipidemia  He does not want to check labs. The patient is asked to make an attempt to improve diet and exercise patterns   Pt to obtain liver and thyroid testing with cardiology  Persist RTO or call  On this date 3/1/2022 I have spent 30 minutes reviewing previous notes, test results and face to face with the patient discussing the diagnosis and importance of compliance with the treatment plan as well as documenting on the day of the visit. Return in about 1 year (around 3/1/2023) for physical.         Subjective   SUBJECTIVE/OBJECTIVE:  HPI: This  73 yo M here for a history and physical  Patient Active Problem List   Diagnosis    Essential hypertension    Mixed hyperlipidemia    Primary osteoarthritis of both knees    Status post left knee replacement    Elevated PSA    PAF (paroxysmal atrial fibrillation) ([address]       Rectal bleeding-intermittent. Pt had a flare 2 weeks ago lasted 10-15 min. No symptoms now. He suspected hemorrhoids, but no rectal pain. Hx of chronic hemorrhoids. No constipation. Last C-scope + polyps in . He did not follow up  +Fatigue off and on  PAF- Pt states Amiodarone was decreased by cardiology  HTN- stable   HLD- he does not worry about  Elevated PSA.  He denies urinary problems  Hyperglycemia    Review of Systems Constitutional: Negative for chills, diaphoresis and fever. HENT: Negative. Eyes: Negative. Respiratory: Negative for cough and shortness of breath. Cardiovascular: Negative for chest pain and palpitations. Gastrointestinal: Positive for blood in stool (off and on. Hx of hemorrhoids). Negative for abdominal pain, constipation, diarrhea and nausea. Genitourinary: Negative for difficulty urinating and dysuria. Musculoskeletal: Negative for back pain. Neurological: Negative for dizziness, light-headedness and headaches. Psychiatric/Behavioral: Negative for dysphoric mood. No Known Allergies  Current Outpatient Medications   Medication Sig Dispense Refill    aspirin 325 MG tablet Take 325 mg by mouth daily      amiodarone (CORDARONE) 200 MG tablet Take 100 mg by mouth daily       metoprolol (LOPRESSOR) 100 MG tablet Take 1 tablet by mouth 2 times daily       Ubiquinol (QUNOL COQ10/UBIQUINOL/SILAS) 100 MG CAPS Take by mouth      Misc Natural Products (PROSTATE SUPPORT PO) Take by mouth      Multiple Vitamins-Minerals (THERAPEUTIC MULTIVITAMIN-MINERALS) tablet Take 1 tablet by mouth daily. No current facility-administered medications for this visit.      Past Medical History:   Diagnosis Date    Arthritis     Back injuries     Elevated PSA     Hyperlipidemia     Hypertension     PAF (paroxysmal atrial fibrillation) (HCC)      Past Surgical History:   Procedure Laterality Date    BACK SURGERY      COLONOSCOPY  6-13-14    4 polyps, Dr. Adrianna Boothe      right side of neck    TOTAL KNEE ARTHROPLASTY Left 02/2017    TOTAL KNEE ARTHROPLASTY Right 12/2017    Per Dr. Katarzyna Khoury History     Tobacco Use    Smoking status: Never Smoker    Smokeless tobacco: Never Used   Substance Use Topics    Alcohol use: Not Currently     Alcohol/week: 0.0 standard drinks     Comment: quit now    Drug use: Yes     Types: Marijuana Benito Budmorenita) Vitals:    03/01/22 1331   BP: 128/76   Site: Left Upper Arm   Position: Sitting   Cuff Size: Medium Adult   Pulse: 59   Temp: 97.7 °F (36.5 °C)   SpO2: 96%   Weight: 195 lb (88.5 kg)   Height: 6' (1.829 m)     Objective   Physical Exam  Vitals reviewed. Constitutional:       General: He is not in acute distress. HENT:      Right Ear: Tympanic membrane normal.      Left Ear: Tympanic membrane normal.   Eyes:      Extraocular Movements: Extraocular movements intact. Conjunctiva/sclera: Conjunctivae normal.      Pupils: Pupils are equal, round, and reactive to light. Cardiovascular:      Rate and Rhythm: Normal rate and regular rhythm. Pulmonary:      Effort: Pulmonary effort is normal. No respiratory distress. Breath sounds: Normal breath sounds. Abdominal:      General: Bowel sounds are normal.      Palpations: Abdomen is soft. Tenderness: There is no abdominal tenderness. Musculoskeletal:      Cervical back: Neck supple. Right lower leg: No edema. Left lower leg: No edema. Skin:     Findings: No rash. Neurological:      General: No focal deficit present. Mental Status: He is alert and oriented to person, place, and time. Psychiatric:         Mood and Affect: Mood normal.                An electronic signature was used to authenticate this note.     --Bruce Royal DO

## 2022-03-02 ENCOUNTER — TELEPHONE (OUTPATIENT)
Dept: GASTROENTEROLOGY | Age: 72
End: 2022-03-02

## 2022-03-03 ENCOUNTER — HOSPITAL ENCOUNTER (OUTPATIENT)
Dept: GENERAL RADIOLOGY | Age: 72
Discharge: HOME | End: 2022-03-03
Payer: COMMERCIAL

## 2022-03-03 ENCOUNTER — HOSPITAL ENCOUNTER (OUTPATIENT)
Age: 72
Discharge: HOME | End: 2022-03-03
Payer: COMMERCIAL

## 2022-03-03 DIAGNOSIS — R06.02 SHORTNESS OF BREATH: ICD-10-CM

## 2022-03-03 DIAGNOSIS — R53.83 FATIGUE, UNSPECIFIED TYPE: ICD-10-CM

## 2022-03-03 DIAGNOSIS — R73.9 HYPERGLYCEMIA: ICD-10-CM

## 2022-03-03 DIAGNOSIS — I10 ESSENTIAL HYPERTENSION: ICD-10-CM

## 2022-03-03 DIAGNOSIS — R97.20 ELEVATED PSA: ICD-10-CM

## 2022-03-03 LAB
ALBUMIN SERPL-MCNC: 4.2 GM/DL (ref 3.4–5)
ALP BLD-CCNC: 74 IU/L (ref 40–129)
ALT SERPL-CCNC: 27 U/L (ref 10–40)
ANION GAP SERPL CALCULATED.3IONS-SCNC: 9 MMOL/L (ref 4–16)
AST SERPL-CCNC: 18 IU/L (ref 15–37)
BASOPHILS ABSOLUTE: 0.1 K/CU MM
BASOPHILS RELATIVE PERCENT: 1 % (ref 0–1)
BILIRUB SERPL-MCNC: 0.3 MG/DL (ref 0–1)
BILIRUBIN DIRECT: 0.2 MG/DL (ref 0–0.3)
BILIRUBIN, INDIRECT: 0.1 MG/DL (ref 0–0.7)
BUN BLDV-MCNC: 13 MG/DL (ref 6–23)
CALCIUM SERPL-MCNC: 9.3 MG/DL (ref 8.3–10.6)
CHLORIDE BLD-SCNC: 103 MMOL/L (ref 99–110)
CO2: 29 MMOL/L (ref 21–32)
CREAT SERPL-MCNC: 1 MG/DL (ref 0.9–1.3)
DIFFERENTIAL TYPE: ABNORMAL
EOSINOPHILS ABSOLUTE: 0.4 K/CU MM
EOSINOPHILS RELATIVE PERCENT: 5.2 % (ref 0–3)
ESTIMATED AVERAGE GLUCOSE: 126 MG/DL
GFR AFRICAN AMERICAN: >60 ML/MIN/1.73M2
GFR NON-AFRICAN AMERICAN: >60 ML/MIN/1.73M2
GLUCOSE BLD-MCNC: 110 MG/DL (ref 70–99)
HBA1C MFR BLD: 6 % (ref 4.2–6.3)
HCT VFR BLD CALC: 44.8 % (ref 42–52)
HEMOGLOBIN: 14.1 GM/DL (ref 13.5–18)
IMMATURE NEUTROPHIL %: 0.3 % (ref 0–0.43)
LYMPHOCYTES ABSOLUTE: 1.6 K/CU MM
LYMPHOCYTES RELATIVE PERCENT: 23.1 % (ref 24–44)
MCH RBC QN AUTO: 29 PG (ref 27–31)
MCHC RBC AUTO-ENTMCNC: 31.5 % (ref 32–36)
MCV RBC AUTO: 92 FL (ref 78–100)
MONOCYTES ABSOLUTE: 0.6 K/CU MM
MONOCYTES RELATIVE PERCENT: 9 % (ref 0–4)
NUCLEATED RBC %: 0 %
PDW BLD-RTO: 13.8 % (ref 11.7–14.9)
PLATELET # BLD: 294 K/CU MM (ref 140–440)
PMV BLD AUTO: 9.1 FL (ref 7.5–11.1)
POTASSIUM SERPL-SCNC: 4.6 MMOL/L (ref 3.5–5.1)
RBC # BLD: 4.87 M/CU MM (ref 4.6–6.2)
SEGMENTED NEUTROPHILS ABSOLUTE COUNT: 4.2 K/CU MM
SEGMENTED NEUTROPHILS RELATIVE PERCENT: 61.4 % (ref 36–66)
SODIUM BLD-SCNC: 141 MMOL/L (ref 135–145)
T4 FREE: 1.2 NG/DL (ref 0.9–1.8)
TOTAL IMMATURE NEUTOROPHIL: 0.02 K/CU MM
TOTAL NUCLEATED RBC: 0 K/CU MM
TOTAL PROTEIN: 6.3 GM/DL (ref 6.4–8.2)
TSH HIGH SENSITIVITY: 2.09 UIU/ML (ref 0.27–4.2)
WBC # BLD: 6.9 K/CU MM (ref 4–10.5)

## 2022-03-03 PROCEDURE — 84443 ASSAY THYROID STIM HORMONE: CPT

## 2022-03-03 PROCEDURE — 83036 HEMOGLOBIN GLYCOSYLATED A1C: CPT

## 2022-03-03 PROCEDURE — 84439 ASSAY OF FREE THYROXINE: CPT

## 2022-03-03 PROCEDURE — 80048 BASIC METABOLIC PNL TOTAL CA: CPT

## 2022-03-03 PROCEDURE — 71046 X-RAY EXAM CHEST 2 VIEWS: CPT

## 2022-03-03 PROCEDURE — 85025 COMPLETE CBC W/AUTO DIFF WBC: CPT

## 2022-03-03 PROCEDURE — 36415 COLL VENOUS BLD VENIPUNCTURE: CPT

## 2022-03-03 PROCEDURE — 80076 HEPATIC FUNCTION PANEL: CPT

## 2022-03-06 ASSESSMENT — ENCOUNTER SYMPTOMS: BLOOD IN STOOL: 1

## 2022-10-04 ENCOUNTER — COMMUNITY OUTREACH (OUTPATIENT)
Dept: INTERNAL MEDICINE CLINIC | Age: 72
End: 2022-10-04

## 2022-10-04 NOTE — PROGRESS NOTES
Patient's HM shows they are current for Colorectal Screening. [employer] and  files searched with success. Results attached to order and HM updated.

## 2022-10-07 ENCOUNTER — HOSPITAL ENCOUNTER (OUTPATIENT)
Age: 72
Discharge: HOME | End: 2022-10-07
Payer: COMMERCIAL

## 2022-10-07 LAB
ALBUMIN SERPL-MCNC: 4.6 GM/DL (ref 3.4–5)
ALP BLD-CCNC: 77 IU/L (ref 40–129)
ALT SERPL-CCNC: 39 U/L (ref 10–40)
AST SERPL-CCNC: 30 IU/L (ref 15–37)
BILIRUB SERPL-MCNC: 0.4 MG/DL (ref 0–1)
BILIRUBIN DIRECT: 0.2 MG/DL (ref 0–0.3)
BILIRUBIN, INDIRECT: 0.2 MG/DL (ref 0–0.7)
T4 FREE: 1.39 NG/DL (ref 0.9–1.8)
TOTAL PROTEIN: 6.7 GM/DL (ref 6.4–8.2)
TSH HIGH SENSITIVITY: 1.92 UIU/ML (ref 0.27–4.2)

## 2022-10-07 PROCEDURE — 84439 ASSAY OF FREE THYROXINE: CPT

## 2022-10-07 PROCEDURE — 36415 COLL VENOUS BLD VENIPUNCTURE: CPT

## 2022-10-07 PROCEDURE — 80076 HEPATIC FUNCTION PANEL: CPT

## 2022-10-07 PROCEDURE — 84443 ASSAY THYROID STIM HORMONE: CPT

## 2023-03-02 ENCOUNTER — OFFICE VISIT (OUTPATIENT)
Dept: INTERNAL MEDICINE CLINIC | Age: 73
End: 2023-03-02
Payer: COMMERCIAL

## 2023-03-02 ENCOUNTER — TELEPHONE (OUTPATIENT)
Dept: INTERNAL MEDICINE CLINIC | Age: 73
End: 2023-03-02

## 2023-03-02 VITALS
HEART RATE: 56 BPM | HEIGHT: 72 IN | DIASTOLIC BLOOD PRESSURE: 80 MMHG | WEIGHT: 187.6 LBS | BODY MASS INDEX: 25.41 KG/M2 | SYSTOLIC BLOOD PRESSURE: 140 MMHG | OXYGEN SATURATION: 98 %

## 2023-03-02 DIAGNOSIS — Z00.00 MEDICARE ANNUAL WELLNESS VISIT, SUBSEQUENT: Primary | ICD-10-CM

## 2023-03-02 DIAGNOSIS — I48.0 PAF (PAROXYSMAL ATRIAL FIBRILLATION) (HCC): ICD-10-CM

## 2023-03-02 DIAGNOSIS — R97.20 ELEVATED PSA: ICD-10-CM

## 2023-03-02 DIAGNOSIS — I10 ESSENTIAL HYPERTENSION: ICD-10-CM

## 2023-03-02 DIAGNOSIS — E78.2 MIXED HYPERLIPIDEMIA: ICD-10-CM

## 2023-03-02 DIAGNOSIS — R73.03 PREDIABETES: ICD-10-CM

## 2023-03-02 DIAGNOSIS — R63.4 WEIGHT LOSS: ICD-10-CM

## 2023-03-02 DIAGNOSIS — R53.83 FATIGUE, UNSPECIFIED TYPE: ICD-10-CM

## 2023-03-02 DIAGNOSIS — M17.0 PRIMARY OSTEOARTHRITIS OF BOTH KNEES: ICD-10-CM

## 2023-03-02 LAB
A/G RATIO: 1.7 (ref 1.1–2.2)
ALBUMIN SERPL-MCNC: 4.3 G/DL (ref 3.4–5)
ALP BLD-CCNC: 79 U/L (ref 40–129)
ALT SERPL-CCNC: 25 U/L (ref 10–40)
ANION GAP SERPL CALCULATED.3IONS-SCNC: 17 MMOL/L (ref 3–16)
AST SERPL-CCNC: 17 U/L (ref 15–37)
BASOPHILS ABSOLUTE: 0.1 K/UL (ref 0–0.2)
BASOPHILS RELATIVE PERCENT: 0.9 %
BILIRUB SERPL-MCNC: 0.3 MG/DL (ref 0–1)
BUN BLDV-MCNC: 19 MG/DL (ref 7–20)
CALCIUM SERPL-MCNC: 9.1 MG/DL (ref 8.3–10.6)
CHLORIDE BLD-SCNC: 100 MMOL/L (ref 99–110)
CHOLESTEROL, TOTAL: 200 MG/DL (ref 0–199)
CO2: 22 MMOL/L (ref 21–32)
CREAT SERPL-MCNC: 0.9 MG/DL (ref 0.8–1.3)
EOSINOPHILS ABSOLUTE: 0.3 K/UL (ref 0–0.6)
EOSINOPHILS RELATIVE PERCENT: 3.3 %
GFR SERPL CREATININE-BSD FRML MDRD: >60 ML/MIN/{1.73_M2}
GLUCOSE BLD-MCNC: 85 MG/DL (ref 70–99)
HCT VFR BLD CALC: 42.4 % (ref 40.5–52.5)
HDLC SERPL-MCNC: 40 MG/DL (ref 40–60)
HEMOGLOBIN: 13.8 G/DL (ref 13.5–17.5)
LDL CHOLESTEROL CALCULATED: 108 MG/DL
LYMPHOCYTES ABSOLUTE: 1.7 K/UL (ref 1–5.1)
LYMPHOCYTES RELATIVE PERCENT: 17.9 %
MCH RBC QN AUTO: 28.7 PG (ref 26–34)
MCHC RBC AUTO-ENTMCNC: 32.7 G/DL (ref 31–36)
MCV RBC AUTO: 87.8 FL (ref 80–100)
MONOCYTES ABSOLUTE: 0.8 K/UL (ref 0–1.3)
MONOCYTES RELATIVE PERCENT: 8.8 %
NEUTROPHILS ABSOLUTE: 6.6 K/UL (ref 1.7–7.7)
NEUTROPHILS RELATIVE PERCENT: 69.1 %
PDW BLD-RTO: 14.1 % (ref 12.4–15.4)
PLATELET # BLD: 306 K/UL (ref 135–450)
PMV BLD AUTO: 7.4 FL (ref 5–10.5)
POTASSIUM SERPL-SCNC: 4.4 MMOL/L (ref 3.5–5.1)
RBC # BLD: 4.83 M/UL (ref 4.2–5.9)
SODIUM BLD-SCNC: 139 MMOL/L (ref 136–145)
TOTAL PROTEIN: 6.9 G/DL (ref 6.4–8.2)
TRIGL SERPL-MCNC: 261 MG/DL (ref 0–150)
VLDLC SERPL CALC-MCNC: 52 MG/DL
WBC # BLD: 9.5 K/UL (ref 4–11)

## 2023-03-02 PROCEDURE — 1123F ACP DISCUSS/DSCN MKR DOCD: CPT | Performed by: FAMILY MEDICINE

## 2023-03-02 PROCEDURE — G0439 PPPS, SUBSEQ VISIT: HCPCS | Performed by: FAMILY MEDICINE

## 2023-03-02 PROCEDURE — 3078F DIAST BP <80 MM HG: CPT | Performed by: FAMILY MEDICINE

## 2023-03-02 PROCEDURE — 3074F SYST BP LT 130 MM HG: CPT | Performed by: FAMILY MEDICINE

## 2023-03-02 PROCEDURE — 36415 COLL VENOUS BLD VENIPUNCTURE: CPT | Performed by: FAMILY MEDICINE

## 2023-03-02 SDOH — ECONOMIC STABILITY: INCOME INSECURITY: HOW HARD IS IT FOR YOU TO PAY FOR THE VERY BASICS LIKE FOOD, HOUSING, MEDICAL CARE, AND HEATING?: NOT HARD AT ALL

## 2023-03-02 SDOH — ECONOMIC STABILITY: FOOD INSECURITY: WITHIN THE PAST 12 MONTHS, YOU WORRIED THAT YOUR FOOD WOULD RUN OUT BEFORE YOU GOT MONEY TO BUY MORE.: NEVER TRUE

## 2023-03-02 SDOH — ECONOMIC STABILITY: FOOD INSECURITY: WITHIN THE PAST 12 MONTHS, THE FOOD YOU BOUGHT JUST DIDN'T LAST AND YOU DIDN'T HAVE MONEY TO GET MORE.: NEVER TRUE

## 2023-03-02 SDOH — ECONOMIC STABILITY: HOUSING INSECURITY
IN THE LAST 12 MONTHS, WAS THERE A TIME WHEN YOU DID NOT HAVE A STEADY PLACE TO SLEEP OR SLEPT IN A SHELTER (INCLUDING NOW)?: NO

## 2023-03-02 ASSESSMENT — PATIENT HEALTH QUESTIONNAIRE - PHQ9
SUM OF ALL RESPONSES TO PHQ QUESTIONS 1-9: 2
SUM OF ALL RESPONSES TO PHQ9 QUESTIONS 1 & 2: 2
2. FEELING DOWN, DEPRESSED OR HOPELESS: 1
1. LITTLE INTEREST OR PLEASURE IN DOING THINGS: 1

## 2023-03-02 ASSESSMENT — LIFESTYLE VARIABLES
HOW MANY STANDARD DRINKS CONTAINING ALCOHOL DO YOU HAVE ON A TYPICAL DAY: PATIENT DOES NOT DRINK
HOW OFTEN DO YOU HAVE A DRINK CONTAINING ALCOHOL: NEVER

## 2023-03-02 NOTE — TELEPHONE ENCOUNTER
I informed the pt that we wouldn't be able to sent the results since it was jsut done today till we get the results. Sorry for any confusion.

## 2023-03-02 NOTE — PATIENT INSTRUCTIONS
Substance Use Disorder: Care Instructions  Overview     You can improve your life and health by stopping your use of alcohol or drugs. When you don't drink or use drugs, you may feel and sleep better. You may get along better with your family, friends, and coworkers. There are medicines and programs that can help with substance use disorder. How can you care for yourself at home? If you have been given medicine to help keep you sober or reduce your cravings, be sure to take it exactly as prescribed. Talk to your doctor about programs that can help you stop using drugs or drinking alcohol. Do not tempt yourself by keeping alcohol or drugs in your home. Learn how to say no when other people drink or use drugs. Or don't spend time with people who drink or use drugs. Use the time and money spent on drinking or drugs to do something fun with your family or friends. Preventing a relapse  Do not drink alcohol or use drugs at all. Using any amount of alcohol or drugs greatly increases your risk for relapse. Seek help from organizations such as Alcoholics Anonymous, Narcotics Anonymous, or treatment facilities if you feel the need to drink alcohol or use drugs again. Remember that recovery is a lifelong process. Stay away from situations, friends, or places that may lead you to drink or use drugs. Have a plan to spot and deal with relapse. Learn to recognize changes in your thinking that lead you to drink or use drugs. These are warning signs. Get help before you start to drink or use drugs again. Get help as soon as you can if you relapse. Some people make a plan with another person that outlines what they want that person to do for them if they relapse. The plan usually includes how to handle the relapse and who to notify in case of relapse. Don't give up. Remember that a relapse does not mean that you have failed. Use the experience to learn the triggers that lead you to drink or use drugs.  Then quit again. Many people have several relapses before they are able to quit for good. Follow-up care is a key part of your treatment and safety. Be sure to make and go to all appointments, and call your doctor if you are having problems. It's also a good idea to know your test results and keep a list of the medicines you take. When should you call for help? Call 911  anytime you think you may need emergency care. For example, call if:    You feel you cannot stop from hurting yourself or someone else. Call your doctor now or seek immediate medical care if:    You have serious withdrawal symptoms, such as confusion, hallucinations, severe trembling, or seizures. Watch closely for changes in your health, and be sure to contact your doctor if:    You have a relapse.     You need more help or support to stop. Where can you learn more? Go to http://[employer].caldwell.com/ and enter H573 to learn more about \"Substance Use Disorder: Care Instructions. \"  Current as of: August 2, 2022               Content Version: 13.5  © 2006-2022 [employer]. Care instructions adapted under license by [facility] ([facility]). If you have questions about a medical condition or this instruction, always ask your healthcare professional. [place] [address] any warranty or liability for your use of this information. Advance Directives: Care Instructions  Overview  An advance directive is a legal way to state your wishes at the end of your life. It tells your family and your doctor what to do if you can't say what you want. There are two main types of advance directives. You can change them any time your wishes change. Living will. This form tells your family and your doctor your wishes about life support and other treatment. The form is also called a declaration. Medical power of . This form lets you name a person to make treatment decisions for you when you can't speak for yourself.  This person is called a health care agent (health care proxy, health care surrogate). The form is also called a durable power of  for health care. If you do not have an advance directive, decisions about your medical care may be made by a family member, or by a doctor or a  who doesn't know you. It may help to think of an advance directive as a gift to the people who care for you. If you have one, they won't have to make tough decisions by themselves. For more information, including forms for your state, see the [address] website (www.caringinfo.org/planning/advance-directives/). Follow-up care is a key part of your treatment and safety. Be sure to make and go to all appointments, and call your doctor if you are having problems. It's also a good idea to know your test results and keep a list of the medicines you take. What should you include in an advance directive? Many states have a unique advance directive form. (It may ask you to address specific issues.) Or you might use a universal form that's approved by many states. If your form doesn't tell you what to address, it may be hard to know what to include in your advance directive. Use the questions below to help you get started. Who do you want to make decisions about your medical care if you are not able to? What life-support measures do you want if you have a serious illness that gets worse over time or can't be cured? What are you most afraid of that might happen? (Maybe you're afraid of having pain, losing your independence, or being kept alive by machines.)  Where would you prefer to die? (Your home? A hospital? A nursing home?)  Do you want to donate your organs when you die? Do you want certain Shinto practices performed before you die? When should you call for help? Be sure to contact your doctor if you have any questions. Where can you learn more?   Go to http://www.[employer].com/ and enter R264 to learn more about \"Advance Directives: Care Instructions. \"  Current as of: June 16, 2022               Content Version: 13.5  © 2006-2022 [employer]. Care instructions adapted under license by [facility] ([facility]). If you have questions about a medical condition or this instruction, always ask your healthcare professional. [facility] [address] any warranty or liability for your use of this information. A Healthy Heart: Care Instructions  Your Care Instructions     Coronary artery disease, also called heart disease, occurs when a substance called plaque builds up in the vessels that supply oxygen-rich blood to your heart muscle. This can narrow the blood vessels and reduce blood flow. A heart attack happens when blood flow is completely blocked. A high-fat diet, smoking, and other factors increase the risk of heart disease. Your doctor has found that you have a chance of having heart disease. You can do lots of things to keep your heart healthy. It may not be easy, but you can change your diet, exercise more, and quit smoking. These steps really work to lower your chance of heart disease. Follow-up care is a key part of your treatment and safety. Be sure to make and go to all appointments, and call your doctor if you are having problems. It's also a good idea to know your test results and keep a list of the medicines you take. How can you care for yourself at home? Diet    Use less salt when you cook and eat. This helps lower your blood pressure. Taste food before salting. Add only a little salt when you think you need it. With time, your taste buds will adjust to less salt.     Eat fewer snack items, fast foods, canned soups, and other high-salt, high-fat, processed foods.     Read food labels and try to avoid saturated and trans fats. They increase your risk of heart disease by raising cholesterol levels.     Limit the amount of solid fat-butter, margarine, and shortening-you eat.  Use olive, peanut, or canola oil when you cook. Bake, broil, and steam foods instead of frying them.     Eat a variety of fruit and vegetables every day. Dark green, deep orange, red, or yellow fruits and vegetables are especially good for you. Examples include spinach, carrots, peaches, and berries.     Foods high in fiber can reduce your cholesterol and provide important vitamins and minerals. High-fiber foods include whole-grain cereals and breads, oatmeal, beans, brown rice, citrus fruits, and apples.     Eat lean proteins. Heart-healthy proteins include seafood, lean meats and poultry, eggs, beans, peas, nuts, seeds, and soy products.     Limit drinks and foods with added sugar. These include candy, desserts, and soda pop. Lifestyle changes    If your doctor recommends it, get more exercise. Walking is a good choice. Bit by bit, increase the amount you walk every day. Try for at least 30 minutes on most days of the week. You also may want to swim, bike, or do other activities.     Do not smoke. If you need help quitting, talk to your doctor about stop-smoking programs and medicines. These can increase your chances of quitting for good. Quitting smoking may be the most important step you can take to protect your heart. It is never too late to quit.     Limit alcohol to 2 drinks a day for men and 1 drink a day for women. Too much alcohol can cause health problems.     Manage other health problems such as diabetes, high blood pressure, and high cholesterol. If you think you may have a problem with alcohol or drug use, talk to your doctor. Medicines    Take your medicines exactly as prescribed. Call your doctor if you think you are having a problem with your medicine.     If your doctor recommends aspirin, take the amount directed each day. Make sure you take aspirin and not another kind of pain reliever, such as acetaminophen (Tylenol). When should you call for help? Call 911 if you have symptoms of a heart attack. These may include:    Chest pain or pressure, or a strange feeling in the chest.     Sweating.     Shortness of breath.     Pain, pressure, or a strange feeling in the back, neck, jaw, or upper belly or in one or both shoulders or arms.     Lightheadedness or sudden weakness.     A fast or irregular heartbeat. After you call 911, the  may tell you to chew 1 adult-strength or 2 to 4 low-dose aspirin. Wait for an ambulance. Do not try to drive yourself. Watch closely for changes in your health, and be sure to contact your doctor if you have any problems. Where can you learn more? Go to http://www.caldwell.com/ and enter F075 to learn more about \"A Healthy Heart: Care Instructions. \"  Current as of: September 7, 2022               Content Version: 13.5  © 8610-6093 [employer]. Care instructions adapted under license by [facility] ([facility]). If you have questions about a medical condition or this instruction, always ask your healthcare professional. [place] [address] any warranty or liability for your use of this information. Personalized Preventive Plan for [employer] Drivers - 3/2/2023  Medicare offers a range of preventive health benefits. Some of the tests and screenings are paid in full while other may be subject to a deductible, co-insurance, and/or copay. Some of these benefits include a comprehensive review of your medical history including lifestyle, illnesses that may run in your family, and various assessments and screenings as appropriate. After reviewing your medical record and screening and assessments performed today your provider may have ordered immunizations, labs, imaging, and/or referrals for you. A list of these orders (if applicable) as well as your Preventive Care list are included within your After Visit Summary for your review.     Other Preventive Recommendations:    A preventive eye exam performed by an eye specialist is recommended every 1-2 years to screen for glaucoma; cataracts, macular degeneration, and other eye disorders. A preventive dental visit is recommended every 6 months. Try to get at least 150 minutes of exercise per week or 10,000 steps per day on a pedometer . Order or download the FREE \"Exercise & Physical Activity: Your Everyday Guide\" from [employer]. Call 6-867.923.7562 or search [employer] online. You need 8568-9492 mg of calcium and 0889-3879 IU of vitamin D per day. It is possible to meet your calcium requirement with diet alone, but a vitamin D supplement is usually necessary to meet this goal.  When exposed to the sun, use a sunscreen that protects against both UVA and UVB radiation with an SPF of 30 or greater. Reapply every 2 to 3 hours or after sweating, drying off with a towel, or swimming. Always wear a seat belt when traveling in a car. Always wear a helmet when riding a bicycle or motorcycle.

## 2023-03-02 NOTE — PROGRESS NOTES
Medicare Annual Wellness Visit    Myrtle Segundo is here for Medicare AWV    Assessment & Plan   Medicare annual wellness visit, subsequent  PAF (paroxysmal atrial fibrillation) ([facility] [address]  Continue amiodarone  He stopped ASA  Essential hypertension  Monitor BP  Continue metoprolol  Elevated PSA  -     PSA, Prostatic Specific Antigen  Primary osteoarthritis of both knees-stable  Fatigue, unspecified type  -     CBC with Auto Differential  -     Comprehensive Metabolic Panel  -     Sedimentation Rate  Prediabetes  -     Hemoglobin A1C  Mixed hyperlipidemia  -     Lipid Panel  -     TSH with Reflex  Weight loss  -     Sedimentation Rate  Medication reconciled  Keep f/u with specialists    Recommendations for Preventive Services Due: see orders and patient instructions/AVS.  Recommended screening schedule for the next 5-10 years is provided to the patient in written form: see Patient Instructions/AVS.     Return for Medicare Annual Wellness Visit in 1 year. Subjective   The following acute and/or chronic problems were also addressed today:    PAF- on amiodarone  HTN- slightly up today. He is on metoprolol. Normally Bp is good  Patient losing his appetite. Mild weight decrease  OA- knee's  Prediabetes  HLD- managing with diet  Elevated PSA. He did not want further urology evaluation  Slightly tired, but he is very active    Patient's complete Health Risk Assessment and screening values have been reviewed and are found in Flowsheets. The following problems were reviewed today and where indicated follow up appointments were made and/or referrals ordered. Positive Risk Factor Screenings with Interventions:          Drug Use:          Interpretation:  1-2: Low level - Monitor, re-assess at a later date  3-5: Moderate level - Further Investigation  6-8: Substantial level - Intensive Assessment  9-10: Severe level - Intensive Assessment    Interventions:  Patient occasionally smokes THC.  Stopped [employer] Screen:  Do you or your family notice any trouble with your hearing that hasn't been managed with hearing aids?: (!) Yes    Interventions:  Patient declines any further evaluation or treatment                       Objective   Vitals:    03/02/23 1258   BP: (!) 148/80   Site: Left Upper Arm   Position: Sitting   Cuff Size: Large Adult   Pulse: 69   SpO2: 97%   Weight: 187 lb 9.6 oz (85.1 kg)   Height: 6' (1.829 m)      Body mass index is 25.44 kg/m². General Appearance: alert and oriented to person, place and time, well-developed and well-nourished, in no acute distress  Eyes: pupils equal, round, and reactive to light, extraocular eye movements intact, conjunctivae normal  Neck: neck supple and non tender without mass, no thyromegaly or thyroid nodules, no cervical lymphadenopathy   Pulmonary/Chest: clear to auscultation bilaterally- no wheezes, rales or rhonchi, normal air movement, no respiratory distress  Cardiovascular: normal rate, normal S1 and S2, and no carotid bruits  Abdomen: soft, non-tender, non-distended, normal bowel sounds, no masses or organomegaly  Extremities: no edema  Neurologic: no cranial nerve deficit and speech normal       No Known Allergies  Prior to Visit Medications    Medication Sig Taking? Authorizing Provider   amiodarone (CORDARONE) 200 MG tablet Take 100 mg by mouth daily  Yes Pastor Madyson MD   metoprolol (LOPRESSOR) 100 MG tablet Take 1 tablet by mouth 2 times daily  Yes Historical Provider, MD   Ubiquinol 100 MG CAPS Take by mouth Yes Historical Provider, MD   Misc Natural Products (PROSTATE SUPPORT PO) Take by mouth Yes Historical Provider, MD   Multiple Vitamins-Minerals (THERAPEUTIC MULTIVITAMIN-MINERALS) tablet Take 1 tablet by mouth daily.  Yes Historical Provider, MD [place] (Including outside providers/suppliers regularly involved in providing care):   Patient Care Team:  Minnie Florentino,  as PCP - General (Family Medicine)  Minnie Florentino DO as PCP - [place] Provider  Hayder Snow MD as Consulting Physician (Cardiology)     Reviewed and updated this visit:  Tobacco  Allergies  Meds  Problems  Med Hx  Surg Hx  Soc Hx  Fam Hx               Leesa Dandy, DO

## 2023-03-02 NOTE — TELEPHONE ENCOUNTER
Pt's wife called because the pt was just in today and had blood work done and when she look over the tests that were done she did not see the T4 free (thyroid test). Pt's wife claimed that he his heart Dr always wants this test and she did not know if we could still have it done with any of the blood.  I have sent a copy of the pt's blood work over to the [facility] as per their request.

## 2023-03-03 LAB
ESTIMATED AVERAGE GLUCOSE: 119.8 MG/DL
HBA1C MFR BLD: 5.8 %
PROSTATE SPECIFIC ANTIGEN: 4.92 NG/ML (ref 0–4)
SEDIMENTATION RATE, ERYTHROCYTE: 26 MM/HR (ref 0–20)
TSH REFLEX: 1.31 UIU/ML (ref 0.27–4.2)

## 2023-03-14 ENCOUNTER — HOSPITAL ENCOUNTER (OUTPATIENT)
Age: 73
Discharge: HOME | End: 2023-03-14
Payer: COMMERCIAL

## 2023-03-14 ENCOUNTER — HOSPITAL ENCOUNTER (OUTPATIENT)
Dept: GENERAL RADIOLOGY | Age: 73
Discharge: HOME | End: 2023-03-14
Payer: COMMERCIAL

## 2023-03-14 DIAGNOSIS — R06.02 SHORTNESS OF BREATH: ICD-10-CM

## 2023-03-14 PROCEDURE — 71046 X-RAY EXAM CHEST 2 VIEWS: CPT

## 2023-04-06 ENCOUNTER — OFFICE VISIT (OUTPATIENT)
Dept: INTERNAL MEDICINE CLINIC | Age: 73
End: 2023-04-06

## 2023-04-06 VITALS
SYSTOLIC BLOOD PRESSURE: 148 MMHG | DIASTOLIC BLOOD PRESSURE: 88 MMHG | HEIGHT: 72 IN | OXYGEN SATURATION: 99 % | WEIGHT: 188.4 LBS | HEART RATE: 58 BPM | BODY MASS INDEX: 25.52 KG/M2

## 2023-04-06 DIAGNOSIS — M25.50 POLYARTHRALGIA: Primary | ICD-10-CM

## 2023-04-06 DIAGNOSIS — I48.0 PAF (PAROXYSMAL ATRIAL FIBRILLATION) (HCC): ICD-10-CM

## 2023-04-06 DIAGNOSIS — Z79.899 HIGH RISK MEDICATIONS (NOT ANTICOAGULANTS) LONG-TERM USE: ICD-10-CM

## 2023-04-06 RX ORDER — PREDNISONE 10 MG/1
10 TABLET ORAL 2 TIMES DAILY
Qty: 14 TABLET | Refills: 0 | Status: SHIPPED | OUTPATIENT
Start: 2023-04-06 | End: 2023-04-13

## 2023-04-06 RX ORDER — PREDNISONE 10 MG/1
TABLET ORAL
Refills: 0 | Status: CANCELLED | OUTPATIENT
Start: 2023-04-06

## 2023-04-06 NOTE — PROGRESS NOTES
Juan Zelaya (:  1950) is a 74 y.o. male,established patient, here for evaluation of the following chief complaint(s):  Follow-up, Discuss Labs, and Joint Pain         ASSESSMENT/PLAN:  1. Polyarthralgia, possible PMR vs other  - Rheumatoid Factor  - Cyclic Citrul Peptide Antibody, IgG  - C-Reactive Protein  - JADIEL Reflex to Antibody Cascade  -Start Trial of prednisone  - predniSONE (DELTASONE) 10 MG tablet; Take 1 tablet by mouth 2 times daily for 7 days  Dispense: 14 tablet; Refill: 0  ADR's explained    2. High risk medications (not anticoagulants) long-term use  Patient has concern about amiodarone  - AMIODARONE LEVEL; Future    3. PAF (paroxysmal atrial fibrillation) (HCC)  On amiodarone and metoprolol per cardiology  I will discuss with cardiology regarding patient concerns  May need to see rheumatology  Return if symptoms worsen or fail to improve.        Lab Results   Component Value Date    WBC 9.5 2023    HGB 13.8 2023    HCT 42.4 2023    MCV 87.8 2023     2023     Lab Results   Component Value Date    CHOL 200 (H) 2023     Lab Results   Component Value Date    TRIG 261 (H) 2023     Lab Results   Component Value Date    HDL 40 2023     Lab Results   Component Value Date    LDLCALC 108 (H) 2023    LDLDIRECT 157 (H) 2017     Lab Results   Component Value Date    LABA1C 5.8 2023     Lab Results   Component Value Date    .8 2023     Lab Results   Component Value Date     2023    K 4.4 2023     2023    CO2 22 2023    BUN 19 2023    CREATININE 0.9 2023    GLUCOSE 85 2023    CALCIUM 9.1 2023    PROT 6.9 2023    LABALBU 4.3 2023    BILITOT 0.3 2023    ALKPHOS 79 2023    AST 17 2023    ALT 25 2023    LABGLOM >60 2023    GFRAA >60 2022    AGRATIO 1.7 2023    GLOB 2.6 2019     TSH 0.27 - 4.20 uIU/mL 1.31

## 2023-04-07 LAB
ANA SER QL IA: NEGATIVE
CCP IGG SERPL-ACNC: <0.5 U/ML (ref 0–2.9)
CRP SERPL-MCNC: 36.8 MG/L (ref 0–5.1)
RHEUMATOID FACT SER IA-ACNC: <10 IU/ML

## 2023-04-07 ASSESSMENT — ENCOUNTER SYMPTOMS
BACK PAIN: 0
COUGH: 0
NAUSEA: 0
SHORTNESS OF BREATH: 0
ABDOMINAL PAIN: 0

## 2023-04-19 ENCOUNTER — PATIENT MESSAGE (OUTPATIENT)
Dept: INTERNAL MEDICINE CLINIC | Age: 73
End: 2023-04-19

## 2023-04-19 DIAGNOSIS — M25.50 POLYARTHRALGIA: Primary | ICD-10-CM

## 2023-06-05 DIAGNOSIS — M25.50 POLYARTHRALGIA: ICD-10-CM

## 2023-06-05 RX ORDER — PREDNISONE 10 MG/1
10 TABLET ORAL 2 TIMES DAILY
Qty: 14 TABLET | Refills: 0 | Status: CANCELLED | OUTPATIENT
Start: 2023-06-05 | End: 2023-06-12

## 2023-06-05 RX ORDER — PREDNISONE 5 MG/1
5 TABLET ORAL 2 TIMES DAILY
Qty: 32 TABLET | Refills: 0 | Status: SHIPPED | OUTPATIENT
Start: 2023-06-05

## 2023-06-05 NOTE — TELEPHONE ENCOUNTER
----- Message from Alisia Berrios sent at 6/5/2023  3:27 PM EDT -----  Regarding: Follow up  Contact: [phone]  Hello, Could I get a refill of the Prednisone 5 mg to hold me over until June 21st when I go to Dr. Duane Barton?                                                             Thank you, Gio Menjivar

## 2023-06-05 NOTE — TELEPHONE ENCOUNTER
Spoke to patient. Medication sent in. He wanted to stay on the lower dose, but he still has some symptoms.  He will keep appt  with specialist on 6/21

## 2023-06-20 ENCOUNTER — TELEPHONE (OUTPATIENT)
Dept: RHEUMATOLOGY | Age: 73
End: 2023-06-20

## 2023-06-21 ENCOUNTER — OFFICE VISIT (OUTPATIENT)
Dept: RHEUMATOLOGY | Age: 73
End: 2023-06-21
Payer: COMMERCIAL

## 2023-06-21 VITALS
OXYGEN SATURATION: 97 % | BODY MASS INDEX: 24.95 KG/M2 | WEIGHT: 184 LBS | SYSTOLIC BLOOD PRESSURE: 130 MMHG | DIASTOLIC BLOOD PRESSURE: 70 MMHG | HEART RATE: 64 BPM

## 2023-06-21 DIAGNOSIS — Z79.52 CURRENT CHRONIC USE OF SYSTEMIC STEROIDS: ICD-10-CM

## 2023-06-21 DIAGNOSIS — M35.3 POLYMYALGIA RHEUMATICA (HCC): Primary | ICD-10-CM

## 2023-06-21 DIAGNOSIS — R70.0 ELEVATED ERYTHROCYTE SEDIMENTATION RATE: ICD-10-CM

## 2023-06-21 DIAGNOSIS — Z01.89 ENCOUNTER FOR OTHER SPECIFIED SPECIAL EXAMINATIONS: ICD-10-CM

## 2023-06-21 DIAGNOSIS — R79.82 ELEVATED C-REACTIVE PROTEIN (CRP): ICD-10-CM

## 2023-06-21 PROCEDURE — 3075F SYST BP GE 130 - 139MM HG: CPT | Performed by: STUDENT IN AN ORGANIZED HEALTH CARE EDUCATION/TRAINING PROGRAM

## 2023-06-21 PROCEDURE — 1123F ACP DISCUSS/DSCN MKR DOCD: CPT | Performed by: STUDENT IN AN ORGANIZED HEALTH CARE EDUCATION/TRAINING PROGRAM

## 2023-06-21 PROCEDURE — 99205 OFFICE O/P NEW HI 60 MIN: CPT | Performed by: STUDENT IN AN ORGANIZED HEALTH CARE EDUCATION/TRAINING PROGRAM

## 2023-06-21 PROCEDURE — 3078F DIAST BP <80 MM HG: CPT | Performed by: STUDENT IN AN ORGANIZED HEALTH CARE EDUCATION/TRAINING PROGRAM

## 2023-06-21 RX ORDER — FOLIC ACID 1 MG/1
1 TABLET ORAL DAILY
Qty: 90 TABLET | Refills: 1 | Status: SHIPPED | OUTPATIENT
Start: 2023-06-21

## 2023-06-21 RX ORDER — PNV NO.95/FERROUS FUM/FOLIC AC 28MG-0.8MG
TABLET ORAL
COMMUNITY
Start: 2023-03-27

## 2023-06-21 RX ORDER — PREDNISONE 5 MG/1
5 TABLET ORAL DAILY
Qty: 60 TABLET | Refills: 0 | Status: SHIPPED | OUTPATIENT
Start: 2023-06-21

## 2023-06-21 NOTE — PATIENT INSTRUCTIONS
You are being treated for polymyalgia rheumatica   Start Methotrexate 5 pills on Mondays   Start folic acid 1 pill daily   Continue prednisone 5mg daily   Daily calcium 1000-1200mg and vit d supplement 1000 units helps to preserve bone health  Complete ordered labs and imaging done  You were referred for bone scan, please schedule   RTC in 2 months

## 2023-06-22 ENCOUNTER — OFFICE VISIT (OUTPATIENT)
Dept: INTERNAL MEDICINE CLINIC | Age: 73
End: 2023-06-22
Payer: COMMERCIAL

## 2023-06-22 ENCOUNTER — HOSPITAL ENCOUNTER (OUTPATIENT)
Age: 73
Discharge: HOME | End: 2023-06-22
Payer: COMMERCIAL

## 2023-06-22 ENCOUNTER — HOSPITAL ENCOUNTER (OUTPATIENT)
Dept: GENERAL RADIOLOGY | Age: 73
Discharge: HOME | End: 2023-06-22
Payer: COMMERCIAL

## 2023-06-22 VITALS
SYSTOLIC BLOOD PRESSURE: 142 MMHG | BODY MASS INDEX: 24.54 KG/M2 | WEIGHT: 181.2 LBS | HEART RATE: 60 BPM | DIASTOLIC BLOOD PRESSURE: 80 MMHG | HEIGHT: 72 IN | OXYGEN SATURATION: 96 %

## 2023-06-22 DIAGNOSIS — M35.3 POLYMYALGIA RHEUMATICA (HCC): Primary | ICD-10-CM

## 2023-06-22 DIAGNOSIS — M35.3 POLYMYALGIA RHEUMATICA (HCC): ICD-10-CM

## 2023-06-22 DIAGNOSIS — R70.0 ELEVATED ERYTHROCYTE SEDIMENTATION RATE: ICD-10-CM

## 2023-06-22 DIAGNOSIS — R79.82 ELEVATED C-REACTIVE PROTEIN (CRP): ICD-10-CM

## 2023-06-22 DIAGNOSIS — Z01.89 ENCOUNTER FOR OTHER SPECIFIED SPECIAL EXAMINATIONS: ICD-10-CM

## 2023-06-22 DIAGNOSIS — Z79.899 HIGH RISK MEDICATIONS (NOT ANTICOAGULANTS) LONG-TERM USE: ICD-10-CM

## 2023-06-22 DIAGNOSIS — I48.0 PAF (PAROXYSMAL ATRIAL FIBRILLATION) (HCC): ICD-10-CM

## 2023-06-22 LAB
ALBUMIN SERPL-MCNC: 4.5 GM/DL (ref 3.4–5)
ALBUMIN SERPL-MCNC: 4.5 GM/DL (ref 3.4–5)
ALP BLD-CCNC: 83 IU/L (ref 40–129)
ALT SERPL-CCNC: 33 U/L (ref 10–40)
ANION GAP SERPL CALCULATED.3IONS-SCNC: 15 MMOL/L (ref 4–16)
AST SERPL-CCNC: 22 IU/L (ref 15–37)
BILIRUB SERPL-MCNC: 0.3 MG/DL (ref 0–1)
BILIRUBIN DIRECT: 0.2 MG/DL (ref 0–0.3)
BILIRUBIN, INDIRECT: 0.1 MG/DL (ref 0–0.7)
BUN SERPL-MCNC: 16 MG/DL (ref 6–23)
CALCIUM SERPL-MCNC: 9.3 MG/DL (ref 8.3–10.6)
CHLORIDE BLD-SCNC: 101 MMOL/L (ref 99–110)
CO2: 24 MMOL/L (ref 21–32)
CREAT SERPL-MCNC: 1 MG/DL (ref 0.9–1.3)
CRP SERPL HS-MCNC: 7 MG/L
ERYTHROCYTE SEDIMENTATION RATE: 9 MM/HR (ref 0–20)
GFR SERPL CREATININE-BSD FRML MDRD: >60 ML/MIN/1.73M2
GLUCOSE SERPL-MCNC: 91 MG/DL (ref 70–99)
HAV IGM SERPL QL IA: NON REACTIVE
HBV CORE IGM SERPL QL IA: NON REACTIVE
HBV SURFACE AG SERPL QL IA: NON REACTIVE
HCT VFR BLD CALC: 46 % (ref 42–52)
HCV AB SERPL QL IA: NON REACTIVE
HEMOGLOBIN: 14.3 GM/DL (ref 13.5–18)
MCH RBC QN AUTO: 28 PG (ref 27–31)
MCHC RBC AUTO-ENTMCNC: 31.1 % (ref 32–36)
MCV RBC AUTO: 90 FL (ref 78–100)
PDW BLD-RTO: 15.3 % (ref 11.7–14.9)
PHOSPHORUS: 3.9 MG/DL (ref 2.5–4.9)
PLATELET # BLD: 355 K/CU MM (ref 140–440)
PMV BLD AUTO: 9.1 FL (ref 7.5–11.1)
POTASSIUM SERPL-SCNC: 4.3 MMOL/L (ref 3.5–5.1)
RBC # BLD: 5.11 M/CU MM (ref 4.6–6.2)
SODIUM BLD-SCNC: 140 MMOL/L (ref 135–145)
TOTAL PROTEIN: 6.9 GM/DL (ref 6.4–8.2)
URATE SERPL-MCNC: 6.8 MG/DL (ref 3.5–7.2)
WBC # BLD: 10.9 K/CU MM (ref 4–10.5)

## 2023-06-22 PROCEDURE — 82248 BILIRUBIN DIRECT: CPT

## 2023-06-22 PROCEDURE — 80299 QUANTITATIVE ASSAY DRUG: CPT

## 2023-06-22 PROCEDURE — 36415 COLL VENOUS BLD VENIPUNCTURE: CPT

## 2023-06-22 PROCEDURE — 86140 C-REACTIVE PROTEIN: CPT

## 2023-06-22 PROCEDURE — 85027 COMPLETE CBC AUTOMATED: CPT

## 2023-06-22 PROCEDURE — 73130 X-RAY EXAM OF HAND: CPT

## 2023-06-22 PROCEDURE — 1123F ACP DISCUSS/DSCN MKR DOCD: CPT | Performed by: FAMILY MEDICINE

## 2023-06-22 PROCEDURE — 3074F SYST BP LT 130 MM HG: CPT | Performed by: FAMILY MEDICINE

## 2023-06-22 PROCEDURE — 80074 ACUTE HEPATITIS PANEL: CPT

## 2023-06-22 PROCEDURE — 80053 COMPREHEN METABOLIC PANEL: CPT

## 2023-06-22 PROCEDURE — 84550 ASSAY OF BLOOD/URIC ACID: CPT

## 2023-06-22 PROCEDURE — 84100 ASSAY OF PHOSPHORUS: CPT

## 2023-06-22 PROCEDURE — 86480 TB TEST CELL IMMUN MEASURE: CPT

## 2023-06-22 PROCEDURE — 3078F DIAST BP <80 MM HG: CPT | Performed by: FAMILY MEDICINE

## 2023-06-22 PROCEDURE — 85652 RBC SED RATE AUTOMATED: CPT

## 2023-06-22 PROCEDURE — 99213 OFFICE O/P EST LOW 20 MIN: CPT | Performed by: FAMILY MEDICINE

## 2023-06-22 ASSESSMENT — ENCOUNTER SYMPTOMS
COUGH: 0
SHORTNESS OF BREATH: 0
NAUSEA: 0
ABDOMINAL PAIN: 0

## 2023-06-22 NOTE — PROGRESS NOTES
Miryam Jackson (:  1950) is a 74 y.o. male,established patient, here for evaluation of the following chief complaint(s):  Follow-up and Joint Pain         ASSESSMENT/PLAN:  1. Polymyalgia rheumatica (HCC)  Continue prednisone 5 mg  He does not want to use MTX  He will obtain lab and imaging ordered by rheumatology    2. PAF (paroxysmal atrial fibrillation) (HCC)  Continue amiodarone and metoprolol  Keep follow up with cardiology  Monitor BP    Return in about 3 months (around 2023) for PMR. Lab Results   Component Value Date    CHOL 200 (H) 2023     Lab Results   Component Value Date    TRIG 261 (H) 2023     Lab Results   Component Value Date    HDL 40 2023     Lab Results   Component Value Date    LDLCALC 108 (H) 2023    LDLDIRECT 157 (H) 2017     Lab Results   Component Value Date    LABA1C 5.8 2023     Lab Results   Component Value Date    .8 2023             Subjective   SUBJECTIVE/OBJECTIVE:    HISTORY OF PRESENT ILLNESS:  This is a 74 y.o. male here for the following:  Patient Active Problem List    Diagnosis Date Noted    PAF (paroxysmal atrial fibrillation) (HCC)     Elevated PSA 2020    Primary osteoarthritis of both knees 2017    Status post left knee replacement 2017    Essential hypertension 2016    Mixed hyperlipidemia 2016      PMR- seen by rheumatology. He wanted to stay on prednisone 5 mg. She was also prescribed methotrexate and folic acid. He states he has researched the medication and he does not want to stay on the medication. He is not sure that he wants to continue with rheumatology as he does not want additional medication  PAF- stable on amiodarone and metoprolol. HTN- slightly elevated today. On metoprolol. He follows with cardiology    Review of Systems   Constitutional:  Negative for diaphoresis and fever. Respiratory:  Negative for cough and shortness of breath.     Cardiovascular:

## 2023-06-25 LAB
QUANTI TB1 MINUS NIL: 0 IU/ML (ref 0–0.34)
QUANTI TB2 MINUS NIL: 0 IU/ML (ref 0–0.34)
QUANTIFERON (R) TB GOLD (INCUBATED): NEGATIVE IU/ML
QUANTIFERON MITOGEN MINUS NIL: >10 IU/ML
QUANTIFERON NIL: 0.01 IU/ML

## 2023-06-27 LAB
AMIODARONE SERPL-MCNC: 0.8 UG/ML (ref 0.5–2)
DESETHYLAMIODARONE SERPL-MCNC: 0.6 UG/ML

## 2023-08-19 NOTE — PROGRESS NOTES
recognition software      Marielle Amaya MD    Portions of this note was copied forward from the note written by me on 6/21/23. I have reviewed and updated the history, physical exam, data, assessment and plan of the note so that it reflects the current evaluation and management of the patient. This note was dictated with voice recognition software.

## 2023-08-21 ENCOUNTER — HOSPITAL ENCOUNTER (OUTPATIENT)
Age: 73
Discharge: HOME | End: 2023-08-21
Payer: COMMERCIAL

## 2023-08-21 ENCOUNTER — OFFICE VISIT (OUTPATIENT)
Dept: RHEUMATOLOGY | Age: 73
End: 2023-08-21
Payer: COMMERCIAL

## 2023-08-21 VITALS
OXYGEN SATURATION: 97 % | BODY MASS INDEX: 24.68 KG/M2 | DIASTOLIC BLOOD PRESSURE: 80 MMHG | HEART RATE: 57 BPM | WEIGHT: 182 LBS | SYSTOLIC BLOOD PRESSURE: 150 MMHG

## 2023-08-21 DIAGNOSIS — Z79.899 HIGH RISK MEDICATION USE: ICD-10-CM

## 2023-08-21 DIAGNOSIS — R79.82 ELEVATED C-REACTIVE PROTEIN (CRP): ICD-10-CM

## 2023-08-21 DIAGNOSIS — M35.3 POLYMYALGIA RHEUMATICA (HCC): Primary | ICD-10-CM

## 2023-08-21 DIAGNOSIS — M35.3 POLYMYALGIA RHEUMATICA (HCC): ICD-10-CM

## 2023-08-21 DIAGNOSIS — Z79.52 CURRENT CHRONIC USE OF SYSTEMIC STEROIDS: ICD-10-CM

## 2023-08-21 LAB
CRP SERPL HS-MCNC: 3 MG/L
ERYTHROCYTE SEDIMENTATION RATE: 5 MM/HR (ref 0–20)
ESTIMATED AVERAGE GLUCOSE: 134 MG/DL
HBA1C MFR BLD: 6.3 % (ref 4.2–6.3)

## 2023-08-21 PROCEDURE — 36415 COLL VENOUS BLD VENIPUNCTURE: CPT

## 2023-08-21 PROCEDURE — 3077F SYST BP >= 140 MM HG: CPT | Performed by: STUDENT IN AN ORGANIZED HEALTH CARE EDUCATION/TRAINING PROGRAM

## 2023-08-21 PROCEDURE — 99215 OFFICE O/P EST HI 40 MIN: CPT | Performed by: STUDENT IN AN ORGANIZED HEALTH CARE EDUCATION/TRAINING PROGRAM

## 2023-08-21 PROCEDURE — 3079F DIAST BP 80-89 MM HG: CPT | Performed by: STUDENT IN AN ORGANIZED HEALTH CARE EDUCATION/TRAINING PROGRAM

## 2023-08-21 PROCEDURE — 83036 HEMOGLOBIN GLYCOSYLATED A1C: CPT

## 2023-08-21 PROCEDURE — 1123F ACP DISCUSS/DSCN MKR DOCD: CPT | Performed by: STUDENT IN AN ORGANIZED HEALTH CARE EDUCATION/TRAINING PROGRAM

## 2023-08-21 PROCEDURE — 85652 RBC SED RATE AUTOMATED: CPT

## 2023-08-21 PROCEDURE — 86140 C-REACTIVE PROTEIN: CPT

## 2023-08-21 RX ORDER — PREDNISONE 5 MG/1
5 TABLET ORAL DAILY
Qty: 120 TABLET | Refills: 0 | Status: SHIPPED | OUTPATIENT
Start: 2023-08-21

## 2023-08-21 RX ORDER — MELATONIN
1000 DAILY
Qty: 90 TABLET | Refills: 1 | Status: SHIPPED | OUTPATIENT
Start: 2023-08-21

## 2023-08-21 NOTE — PATIENT INSTRUCTIONS
You are being treated for polymyalgia rheumatica   Continue prednisone 5mg daily   Daily vit d supplement 1000 units helps to preserve bone health  Complete ordered labs  Get DEXA scan done  RTC in 2 months

## 2023-09-22 ENCOUNTER — HOSPITAL ENCOUNTER (OUTPATIENT)
Dept: WOMENS IMAGING | Age: 73
Discharge: HOME | End: 2023-09-22
Attending: STUDENT IN AN ORGANIZED HEALTH CARE EDUCATION/TRAINING PROGRAM
Payer: COMMERCIAL

## 2023-09-22 DIAGNOSIS — M35.3 POLYMYALGIA RHEUMATICA (HCC): ICD-10-CM

## 2023-09-22 DIAGNOSIS — Z79.52 CURRENT CHRONIC USE OF SYSTEMIC STEROIDS: ICD-10-CM

## 2023-09-22 PROCEDURE — 77080 DXA BONE DENSITY AXIAL: CPT

## 2023-09-28 ENCOUNTER — OFFICE VISIT (OUTPATIENT)
Dept: INTERNAL MEDICINE CLINIC | Age: 73
End: 2023-09-28
Payer: COMMERCIAL

## 2023-09-28 VITALS
HEIGHT: 72 IN | HEART RATE: 53 BPM | DIASTOLIC BLOOD PRESSURE: 80 MMHG | BODY MASS INDEX: 24.46 KG/M2 | WEIGHT: 180.6 LBS | SYSTOLIC BLOOD PRESSURE: 140 MMHG | OXYGEN SATURATION: 96 %

## 2023-09-28 DIAGNOSIS — N40.1 BENIGN PROSTATIC HYPERPLASIA WITH NOCTURIA: ICD-10-CM

## 2023-09-28 DIAGNOSIS — R97.20 ELEVATED PSA: ICD-10-CM

## 2023-09-28 DIAGNOSIS — E78.2 MIXED HYPERLIPIDEMIA: ICD-10-CM

## 2023-09-28 DIAGNOSIS — R73.03 PREDIABETES: Primary | ICD-10-CM

## 2023-09-28 DIAGNOSIS — M35.3 PMR (POLYMYALGIA RHEUMATICA) (HCC): ICD-10-CM

## 2023-09-28 DIAGNOSIS — R35.1 BENIGN PROSTATIC HYPERPLASIA WITH NOCTURIA: ICD-10-CM

## 2023-09-28 PROCEDURE — 3074F SYST BP LT 130 MM HG: CPT | Performed by: FAMILY MEDICINE

## 2023-09-28 PROCEDURE — 3078F DIAST BP <80 MM HG: CPT | Performed by: FAMILY MEDICINE

## 2023-09-28 PROCEDURE — 1123F ACP DISCUSS/DSCN MKR DOCD: CPT | Performed by: FAMILY MEDICINE

## 2023-09-28 PROCEDURE — 99214 OFFICE O/P EST MOD 30 MIN: CPT | Performed by: FAMILY MEDICINE

## 2023-09-28 PROCEDURE — 36415 COLL VENOUS BLD VENIPUNCTURE: CPT | Performed by: FAMILY MEDICINE

## 2023-09-28 ASSESSMENT — ENCOUNTER SYMPTOMS
NAUSEA: 0
COUGH: 0
ABDOMINAL PAIN: 0
SHORTNESS OF BREATH: 0

## 2023-09-28 NOTE — PROGRESS NOTES
(1.829 m)     Objective   Physical Exam  Vitals reviewed. Constitutional:       General: He is not in acute distress. Eyes:      Extraocular Movements: Extraocular movements intact. Cardiovascular:      Rate and Rhythm: Regular rhythm. Bradycardia present. Pulmonary:      Effort: Pulmonary effort is normal. No respiratory distress. Breath sounds: Normal breath sounds. Abdominal:      Palpations: Abdomen is soft. Tenderness: There is no abdominal tenderness. Musculoskeletal:      Cervical back: Neck supple. Right lower leg: No edema. Left lower leg: No edema. Neurological:      Mental Status: He is alert and oriented to person, place, and time. Psychiatric:         Mood and Affect: Mood normal.                An electronic signature was used to authenticate this note. --Vita Rollins,      This dictation was generated by voice recognition computer software. Although all attempts are made to edit the dictation for accuracy, there may be errors in the transcription that are not intended.

## 2023-09-29 LAB
BACTERIA URNS QL MICRO: ABNORMAL /HPF
BILIRUB UR QL STRIP.AUTO: NEGATIVE
CALCIUM OXALATE CRYSTALS: PRESENT
CLARITY UR: CLEAR
COLOR UR: ABNORMAL
EPI CELLS #/AREA URNS AUTO: 0 /HPF (ref 0–5)
GLUCOSE UR STRIP.AUTO-MCNC: NEGATIVE MG/DL
HGB UR QL STRIP.AUTO: NEGATIVE
HYALINE CASTS #/AREA URNS AUTO: 1 /LPF (ref 0–8)
KETONES UR STRIP.AUTO-MCNC: NEGATIVE MG/DL
LEUKOCYTE ESTERASE UR QL STRIP.AUTO: ABNORMAL
NITRITE UR QL STRIP.AUTO: NEGATIVE
PH UR STRIP.AUTO: 5.5 [PH] (ref 5–8)
PROT UR STRIP.AUTO-MCNC: NEGATIVE MG/DL
PSA SERPL DL<=0.01 NG/ML-MCNC: 5.51 NG/ML (ref 0–4)
RBC CLUMPS #/AREA URNS AUTO: 1 /HPF (ref 0–4)
SP GR UR STRIP.AUTO: 1.02 (ref 1–1.03)
T4 FREE SERPL-MCNC: 1.3 NG/DL (ref 0.9–1.8)
TSH SERPL DL<=0.005 MIU/L-ACNC: 2.06 UIU/ML (ref 0.27–4.2)
UA COMPLETE W REFLEX CULTURE PNL UR: ABNORMAL
UA DIPSTICK W REFLEX MICRO PNL UR: YES
URN SPEC COLLECT METH UR: ABNORMAL
UROBILINOGEN UR STRIP-ACNC: 0.2 E.U./DL
WBC #/AREA URNS AUTO: 4 /HPF (ref 0–5)

## 2023-12-02 NOTE — PROGRESS NOTES
RHEUMATOLOGY FOLLOW-UP VISIT    2023      Patient Name: Lacho Godinez  : 1950  Medical Record: [id number]      CHIEF COMPLAINT    PMR  Elevated CRP  Chronic steroid use    Pertinent Problems  Rectal bleeding  HTN  PAF  Status post left knee replacement  Primary OA bilateral knees    HISTORY OF PRESENT ILLNESS    Lacho Godinez is a 74 y.o. male who established on 2023  Joint pain began 2023. He woke up with severe pain in hips and shoulders. He felt paralyzed and pain was excruciating. Ibuprofen eased the pain but pain did not completely resolve. His PCP started him on prednisone 10mg bid x 1 week that significantly helped. Pain returned, involving his wrist and hands. Prednisone 10mg bid was restarted again, he took for one week, he stopped then wrist and hand pain returned. There is no pain in shoulders and hips. He reduced prednisone to 5mg daily due to concerns of long term side effects of prednisone which has kept  his joint pain bearable. He is currently on prednisone 5mg daily.     There was joint pain and discomfort in bilateral hands  There is joint swelling as the day progresses  Relieving factors: activity, but hands tightens up in the evening prior to his next medication   Worsening factors: missing prednisone   Pain level today: 4-10/10   Functional status: He is retired, worked with [employer]     Risk factors: no smoking, no etoh, no obesity,medical marijuana to relax, family hx of psoriasis, RA, Lupus or other CTD     LCV: 2023  Prednisone 5mg daily     Subjective:  He is on Amiodarone and is concerned about side effects  He has no pain today   There is no hand swelling  There is no hand stiffness   Overall doing well prednisone    Current rheum meds: Prednisone 5 mg daily     Past rheum meds:          No data to display                    REVIEW OF SYSTEMS     Constitutional:  Denies fever or chills, decreased appetite, or weight loss   Eyes:  Denies change in visual acuity or

## 2023-12-05 ENCOUNTER — OFFICE VISIT (OUTPATIENT)
Dept: RHEUMATOLOGY | Age: 73
End: 2023-12-05
Payer: COMMERCIAL

## 2023-12-05 VITALS
SYSTOLIC BLOOD PRESSURE: 100 MMHG | HEART RATE: 54 BPM | OXYGEN SATURATION: 96 % | WEIGHT: 183 LBS | BODY MASS INDEX: 24.82 KG/M2 | DIASTOLIC BLOOD PRESSURE: 65 MMHG

## 2023-12-05 DIAGNOSIS — Z79.52 CURRENT CHRONIC USE OF SYSTEMIC STEROIDS: ICD-10-CM

## 2023-12-05 DIAGNOSIS — M35.3 PMR (POLYMYALGIA RHEUMATICA) (HCC): Primary | ICD-10-CM

## 2023-12-05 DIAGNOSIS — M85.89 OSTEOPENIA OF MULTIPLE SITES: ICD-10-CM

## 2023-12-05 PROCEDURE — 1123F ACP DISCUSS/DSCN MKR DOCD: CPT | Performed by: STUDENT IN AN ORGANIZED HEALTH CARE EDUCATION/TRAINING PROGRAM

## 2023-12-05 PROCEDURE — 3074F SYST BP LT 130 MM HG: CPT | Performed by: STUDENT IN AN ORGANIZED HEALTH CARE EDUCATION/TRAINING PROGRAM

## 2023-12-05 PROCEDURE — 99214 OFFICE O/P EST MOD 30 MIN: CPT | Performed by: STUDENT IN AN ORGANIZED HEALTH CARE EDUCATION/TRAINING PROGRAM

## 2023-12-05 PROCEDURE — 3078F DIAST BP <80 MM HG: CPT | Performed by: STUDENT IN AN ORGANIZED HEALTH CARE EDUCATION/TRAINING PROGRAM

## 2023-12-05 RX ORDER — PREDNISONE 1 MG/1
TABLET ORAL
Qty: 270 TABLET | Refills: 0 | Status: SHIPPED | OUTPATIENT
Start: 2023-12-05

## 2023-12-05 NOTE — PATIENT INSTRUCTIONS
You are being treated for polymyalgia rheumatica   Reduce prednisone to 4 tablets daily, then 4 tabs daily then 3 tabs daily for 30 days then 2 tabs daily for 30 days  Daily calcium 1000-1200mg and vit d supplement 1000 units helps to preserve bone health  RTC in 3 months

## 2024-03-02 NOTE — PROGRESS NOTES
minutes on the date of service, preparing to see the patient (eg, review of tests), obtaining and/or reviewing separately obtained history, counseling, ordering medications, tests, or procedures, documenting clinical information in the electronic or other health record, care coordination (not separately reported).This note was dictated with voice recognition software      Brandon Kelley MD    Portions of this note was copied forward from the note written by me on 12/5/2023.  I have reviewed and updated the history, physical exam, data, assessment and plan of the note so that it reflects the current evaluation and management of the patient. This note was dictated with voice recognition software.

## 2024-03-06 ENCOUNTER — OFFICE VISIT (OUTPATIENT)
Dept: RHEUMATOLOGY | Age: 74
End: 2024-03-06
Payer: COMMERCIAL

## 2024-03-06 VITALS
BODY MASS INDEX: 24.82 KG/M2 | HEART RATE: 60 BPM | WEIGHT: 183 LBS | SYSTOLIC BLOOD PRESSURE: 135 MMHG | OXYGEN SATURATION: 97 % | DIASTOLIC BLOOD PRESSURE: 80 MMHG

## 2024-03-06 DIAGNOSIS — M35.3 PMR (POLYMYALGIA RHEUMATICA) (HCC): Primary | ICD-10-CM

## 2024-03-06 DIAGNOSIS — M85.89 OSTEOPENIA OF MULTIPLE SITES: ICD-10-CM

## 2024-03-06 PROCEDURE — 99214 OFFICE O/P EST MOD 30 MIN: CPT | Performed by: STUDENT IN AN ORGANIZED HEALTH CARE EDUCATION/TRAINING PROGRAM

## 2024-03-06 PROCEDURE — 3079F DIAST BP 80-89 MM HG: CPT | Performed by: STUDENT IN AN ORGANIZED HEALTH CARE EDUCATION/TRAINING PROGRAM

## 2024-03-06 PROCEDURE — 3075F SYST BP GE 130 - 139MM HG: CPT | Performed by: STUDENT IN AN ORGANIZED HEALTH CARE EDUCATION/TRAINING PROGRAM

## 2024-03-06 PROCEDURE — 1123F ACP DISCUSS/DSCN MKR DOCD: CPT | Performed by: STUDENT IN AN ORGANIZED HEALTH CARE EDUCATION/TRAINING PROGRAM

## 2024-03-06 RX ORDER — PREDNISONE 5 MG/1
5 TABLET ORAL DAILY
Qty: 30 TABLET | Refills: 0 | Status: SHIPPED | OUTPATIENT
Start: 2024-03-06 | End: 2024-04-05

## 2024-03-06 NOTE — PATIENT INSTRUCTIONS
Stop prednisone  Daily calcium 1000-1200mg and vit d supplement 1000 units helps to preserve bone health  Let me know if you flare  Okay to take 2-3 tabs of prednisone daily for joint flare   RTC in 10 weeks    Lab Locations:    [facility]  [facility]  [address] [address]    Central [phone]

## 2024-03-28 LAB
LEFT VENTRICULAR EJECTION FRACTION HIGH VALUE: NORMAL
LEFT VENTRICULAR EJECTION FRACTION MODE: NORMAL
LV EF: NORMAL %

## 2024-04-03 ASSESSMENT — PATIENT HEALTH QUESTIONNAIRE - PHQ9
1. LITTLE INTEREST OR PLEASURE IN DOING THINGS: NOT AT ALL
SUM OF ALL RESPONSES TO PHQ QUESTIONS 1-9: 0
1. LITTLE INTEREST OR PLEASURE IN DOING THINGS: NOT AT ALL
SUM OF ALL RESPONSES TO PHQ QUESTIONS 1-9: 0
SUM OF ALL RESPONSES TO PHQ QUESTIONS 1-9: 0
SUM OF ALL RESPONSES TO PHQ9 QUESTIONS 1 & 2: 0
2. FEELING DOWN, DEPRESSED OR HOPELESS: NOT AT ALL
2. FEELING DOWN, DEPRESSED OR HOPELESS: NOT AT ALL
SUM OF ALL RESPONSES TO PHQ9 QUESTIONS 1 & 2: 0
SUM OF ALL RESPONSES TO PHQ QUESTIONS 1-9: 0

## 2024-04-05 SDOH — ECONOMIC STABILITY: FOOD INSECURITY: WITHIN THE PAST 12 MONTHS, THE FOOD YOU BOUGHT JUST DIDN'T LAST AND YOU DIDN'T HAVE MONEY TO GET MORE.: NEVER TRUE

## 2024-04-05 SDOH — ECONOMIC STABILITY: INCOME INSECURITY: HOW HARD IS IT FOR YOU TO PAY FOR THE VERY BASICS LIKE FOOD, HOUSING, MEDICAL CARE, AND HEATING?: NOT HARD AT ALL

## 2024-04-05 SDOH — ECONOMIC STABILITY: FOOD INSECURITY: WITHIN THE PAST 12 MONTHS, YOU WORRIED THAT YOUR FOOD WOULD RUN OUT BEFORE YOU GOT MONEY TO BUY MORE.: NEVER TRUE

## 2024-04-05 SDOH — ECONOMIC STABILITY: TRANSPORTATION INSECURITY
IN THE PAST 12 MONTHS, HAS LACK OF TRANSPORTATION KEPT YOU FROM MEETINGS, WORK, OR FROM GETTING THINGS NEEDED FOR DAILY LIVING?: NO

## 2024-04-08 ENCOUNTER — OFFICE VISIT (OUTPATIENT)
Dept: INTERNAL MEDICINE CLINIC | Age: 74
End: 2024-04-08
Payer: COMMERCIAL

## 2024-04-08 VITALS
TEMPERATURE: 97.7 F | WEIGHT: 174 LBS | DIASTOLIC BLOOD PRESSURE: 68 MMHG | HEIGHT: 72 IN | SYSTOLIC BLOOD PRESSURE: 120 MMHG | OXYGEN SATURATION: 93 % | HEART RATE: 118 BPM | BODY MASS INDEX: 23.57 KG/M2

## 2024-04-08 VITALS
HEIGHT: 72 IN | DIASTOLIC BLOOD PRESSURE: 68 MMHG | HEART RATE: 115 BPM | BODY MASS INDEX: 23.65 KG/M2 | SYSTOLIC BLOOD PRESSURE: 120 MMHG | WEIGHT: 174.6 LBS | OXYGEN SATURATION: 93 %

## 2024-04-08 DIAGNOSIS — E78.2 MIXED HYPERLIPIDEMIA: ICD-10-CM

## 2024-04-08 DIAGNOSIS — R73.03 PREDIABETES: ICD-10-CM

## 2024-04-08 DIAGNOSIS — I48.0 PAF (PAROXYSMAL ATRIAL FIBRILLATION) (HCC): ICD-10-CM

## 2024-04-08 DIAGNOSIS — R97.20 ELEVATED PSA: ICD-10-CM

## 2024-04-08 DIAGNOSIS — Z00.00 MEDICARE ANNUAL WELLNESS VISIT, SUBSEQUENT: Primary | ICD-10-CM

## 2024-04-08 DIAGNOSIS — R43.1 ABNORMAL SMELL: Primary | ICD-10-CM

## 2024-04-08 PROCEDURE — 99214 OFFICE O/P EST MOD 30 MIN: CPT | Performed by: FAMILY MEDICINE

## 2024-04-08 PROCEDURE — 3074F SYST BP LT 130 MM HG: CPT | Performed by: FAMILY MEDICINE

## 2024-04-08 PROCEDURE — 3078F DIAST BP <80 MM HG: CPT | Performed by: FAMILY MEDICINE

## 2024-04-08 PROCEDURE — G0439 PPPS, SUBSEQ VISIT: HCPCS | Performed by: FAMILY MEDICINE

## 2024-04-08 PROCEDURE — G2211 COMPLEX E/M VISIT ADD ON: HCPCS | Performed by: FAMILY MEDICINE

## 2024-04-08 PROCEDURE — 1123F ACP DISCUSS/DSCN MKR DOCD: CPT | Performed by: FAMILY MEDICINE

## 2024-04-08 ASSESSMENT — ENCOUNTER SYMPTOMS
COUGH: 0
ABDOMINAL PAIN: 0
SHORTNESS OF BREATH: 0
NAUSEA: 0

## 2024-04-08 ASSESSMENT — PATIENT HEALTH QUESTIONNAIRE - PHQ9
2. FEELING DOWN, DEPRESSED OR HOPELESS: NOT AT ALL
1. LITTLE INTEREST OR PLEASURE IN DOING THINGS: NOT AT ALL
SUM OF ALL RESPONSES TO PHQ QUESTIONS 1-9: 0
SUM OF ALL RESPONSES TO PHQ9 QUESTIONS 1 & 2: 0
SUM OF ALL RESPONSES TO PHQ QUESTIONS 1-9: 0

## 2024-04-08 NOTE — PATIENT INSTRUCTIONS

## 2024-04-08 NOTE — PROGRESS NOTES
Medicare Annual Wellness Visit    Adam Berrios is here for Medicare AWV    Assessment & Plan   Medicare annual wellness visit, subsequent  Recommendations for Preventive Services Due: see orders and patient instructions/AVS.  Recommended screening schedule for the next 5-10 years is provided to the patient in written form: see Patient Instructions/AVS.     No follow-ups on file.     Subjective       Patient's complete Health Risk Assessment and screening values have been reviewed and are found in Flowsheets. The following problems were reviewed today and where indicated follow up appointments were made and/or referrals ordered.    Positive Risk Factor Screenings with Interventions:          Drug Use:   Substance and Sexual Activity   Drug Use Yes    Frequency: 3.0 times per week    Types: Marijuana (Weed)    Comment: medical marijuana for relaxation     Interventions:  Patient comments: patient states he smokes marijuana 3 times per week for \"relaxation\", See AVS for additional education material            Vision Screen:  Do you have difficulty driving, watching TV, or doing any of your daily activities because of your eyesight?: No  Have you had an eye exam within the past year?: (!) No (goes when he has issues)  No results found.    Interventions:   Patient comments: states he sees his eye doctor when he notices he is having issues.  See AVS for additional education material                    Objective   Vitals:    04/08/24 1121   BP: 120/68   Pulse: (!) 118   Temp: 97.7 °F (36.5 °C)   SpO2: 93%   Weight: 78.9 kg (174 lb)   Height: 1.829 m (6')      Body mass index is 23.6 kg/m².               No Known Allergies  Prior to Visit Medications    Medication Sig Taking? Authorizing Provider   vitamin D3 (CHOLECALCIFEROL) 25 MCG (1000 UT) TABS tablet Take 1 tablet by mouth daily Yes Brandon Kelley MD   Krill Oil [place]-3 500 MG CAPS  Yes Provider, MD Page   amiodarone (CORDARONE) 200 MG tablet

## 2024-04-08 NOTE — PROGRESS NOTES
2.6 01/22/2019     Lab Results   Component Value Date    TSH 2.06 09/28/2023     Lab Results   Component Value Date/Time    COLORU DARK YELLOW 09/28/2023 01:55 PM    NITRU Negative 09/28/2023 01:55 PM    GLUCOSEU Negative 09/28/2023 01:55 PM    KETUA Negative 09/28/2023 01:55 PM    UROBILINOGEN 0.2 09/28/2023 01:55 PM    BILIRUBINUR Negative 09/28/2023 01:55 PM     Lab Results   Component Value Date    PSA 5.51 (H) 09/28/2023    PSADIA 3.00 04/10/2014       Subjective   SUBJECTIVE/OBJECTIVE:    HISTORY OF PRESENT ILLNESS:  This is a 74 y.o. male here for the following:  Patient Active Problem List    Diagnosis Date Noted    PMR (polymyalgia rheumatica) ([facility]) 09/28/2023    PAF (paroxysmal atrial fibrillation) ([facility])     Elevated PSA 07/11/2020    Primary osteoarthritis of both knees 05/12/2017    Status post left knee replacement 05/12/2017    Essential hypertension 04/03/2016    Mixed hyperlipidemia 04/03/2016        He complains of a chemical smell. He states heat can bring it on, but not always. It has started 2 months ago. He can smell it anywhere. It is a brief smell. He states he smelled it in his truck.  He denies sinus congestion, drainage or alteration in taste.  PAF- it can go in and out of rhythm. On amiodarone and metoprolol.  He is not on a blood thinner.  Heart rate is slightly fast today but he is in sinus rhythm.  He plans to call cardiology today.  He is not in acute distress  Prediabetes-last hemoglobin A1c 6.3  HLD-uncontrolled.  He is trying to manage with his diet  Elevated PSA-he denies symptoms.  Last PSA is 5.5.  He has declined to follow-up with urology      Review of Systems   Constitutional:  Negative for diaphoresis and fever.   Respiratory:  Negative for cough and shortness of breath.    Cardiovascular:  Negative for chest pain.   Gastrointestinal:  Negative for abdominal pain and nausea.   Genitourinary:  Negative for difficulty urinating.   Neurological:  Negative for dizziness and

## 2024-04-10 ENCOUNTER — NURSE ONLY (OUTPATIENT)
Dept: INTERNAL MEDICINE CLINIC | Age: 74
End: 2024-04-10
Payer: COMMERCIAL

## 2024-04-10 DIAGNOSIS — R73.03 PREDIABETES: ICD-10-CM

## 2024-04-10 DIAGNOSIS — E78.2 MIXED HYPERLIPIDEMIA: ICD-10-CM

## 2024-04-10 DIAGNOSIS — R97.20 ELEVATED PSA: Primary | ICD-10-CM

## 2024-04-10 PROCEDURE — 36415 COLL VENOUS BLD VENIPUNCTURE: CPT | Performed by: FAMILY MEDICINE

## 2024-04-11 LAB
ALBUMIN SERPL-MCNC: 4.5 G/DL (ref 3.4–5)
ALBUMIN/GLOB SERPL: 2 {RATIO} (ref 1.1–2.2)
ALP SERPL-CCNC: 79 U/L (ref 40–129)
ALT SERPL-CCNC: 24 U/L (ref 10–40)
ANION GAP SERPL CALCULATED.3IONS-SCNC: 16 MMOL/L (ref 3–16)
AST SERPL-CCNC: 21 U/L (ref 15–37)
BASOPHILS # BLD: 0 K/UL (ref 0–0.2)
BASOPHILS NFR BLD: 0 %
BILIRUB SERPL-MCNC: 0.7 MG/DL (ref 0–1)
BUN SERPL-MCNC: 18 MG/DL (ref 7–20)
CALCIUM SERPL-MCNC: 9.4 MG/DL (ref 8.3–10.6)
CHLORIDE SERPL-SCNC: 104 MMOL/L (ref 99–110)
CHOLEST SERPL-MCNC: 191 MG/DL (ref 0–199)
CO2 SERPL-SCNC: 24 MMOL/L (ref 21–32)
CREAT SERPL-MCNC: 1.1 MG/DL (ref 0.8–1.3)
DEPRECATED RDW RBC AUTO: 13.8 % (ref 12.4–15.4)
EOSINOPHIL # BLD: 0.1 K/UL (ref 0–0.6)
EOSINOPHIL NFR BLD: 1 %
EST. AVERAGE GLUCOSE BLD GHB EST-MCNC: 119.8 MG/DL
GFR SERPLBLD CREATININE-BSD FMLA CKD-EPI: 69 ML/MIN/{1.73_M2}
GLUCOSE SERPL-MCNC: 119 MG/DL (ref 70–99)
HBA1C MFR BLD: 5.8 %
HCT VFR BLD AUTO: 47.5 % (ref 40.5–52.5)
HDLC SERPL-MCNC: 42 MG/DL (ref 40–60)
HGB BLD-MCNC: 15.9 G/DL (ref 13.5–17.5)
LDLC SERPL CALC-MCNC: 112 MG/DL
LYMPHOCYTES # BLD: 3.2 K/UL (ref 1–5.1)
LYMPHOCYTES NFR BLD: 20 %
MCH RBC QN AUTO: 29.6 PG (ref 26–34)
MCHC RBC AUTO-ENTMCNC: 33.5 G/DL (ref 31–36)
MCV RBC AUTO: 88.4 FL (ref 80–100)
MONOCYTES # BLD: 1 K/UL (ref 0–1.3)
MONOCYTES NFR BLD: 10 %
MONONUC CELLS NFR BLD MANUAL: 2 %
NEUTROPHILS # BLD: 5.6 K/UL (ref 1.7–7.7)
NEUTROPHILS NFR BLD: 48 %
NEUTS BAND NFR BLD MANUAL: 7 % (ref 0–7)
PATH INTERP BLD-IMP: NORMAL
PATH INTERP BLD-IMP: YES
PLATELET # BLD AUTO: 322 K/UL (ref 135–450)
PMV BLD AUTO: 7.8 FL (ref 5–10.5)
POTASSIUM SERPL-SCNC: 3.9 MMOL/L (ref 3.5–5.1)
PROT SERPL-MCNC: 6.7 G/DL (ref 6.4–8.2)
PSA SERPL DL<=0.01 NG/ML-MCNC: 6.36 NG/ML (ref 0–4)
RBC # BLD AUTO: 5.38 M/UL (ref 4.2–5.9)
SODIUM SERPL-SCNC: 144 MMOL/L (ref 136–145)
TRIGL SERPL-MCNC: 185 MG/DL (ref 0–150)
TSH SERPL DL<=0.005 MIU/L-ACNC: 2.76 UIU/ML (ref 0.27–4.2)
VARIANT LYMPHS NFR BLD MANUAL: 12 % (ref 0–6)
VLDLC SERPL CALC-MCNC: 37 MG/DL
WBC # BLD AUTO: 10.1 K/UL (ref 4–11)

## 2024-04-18 DIAGNOSIS — D72.820 ATYPICAL LYMPHOCYTOSIS: Primary | ICD-10-CM

## 2024-08-30 ENCOUNTER — HOSPITAL ENCOUNTER (OUTPATIENT)
Age: 74
Discharge: HOME | End: 2024-08-30
Payer: COMMERCIAL

## 2024-08-30 DIAGNOSIS — D72.820 ATYPICAL LYMPHOCYTOSIS: ICD-10-CM

## 2024-08-30 LAB
BASOPHILS ABSOLUTE: 0.1 K/CU MM
BASOPHILS RELATIVE PERCENT: 1.1 % (ref 0–1)
DIFFERENTIAL TYPE: ABNORMAL
EOSINOPHILS ABSOLUTE: 0.6 K/CU MM
EOSINOPHILS RELATIVE PERCENT: 8.3 % (ref 0–3)
HCT VFR BLD CALC: 43.6 % (ref 42–52)
HEMOGLOBIN: 13.9 GM/DL (ref 13.5–18)
IMMATURE NEUTROPHIL %: 0.1 % (ref 0–0.43)
LYMPHOCYTES ABSOLUTE: 1.5 K/CU MM
LYMPHOCYTES RELATIVE PERCENT: 21.3 % (ref 24–44)
MCH RBC QN AUTO: 29 PG (ref 27–31)
MCHC RBC AUTO-ENTMCNC: 31.9 % (ref 32–36)
MCV RBC AUTO: 90.8 FL (ref 78–100)
MONOCYTES ABSOLUTE: 0.7 K/CU MM
MONOCYTES RELATIVE PERCENT: 9.5 % (ref 0–4)
NEUTROPHILS ABSOLUTE: 4.3 K/CU MM
NEUTROPHILS RELATIVE PERCENT: 59.7 % (ref 36–66)
NUCLEATED RBC %: 0 %
PDW BLD-RTO: 13.8 % (ref 11.7–14.9)
PLATELET # BLD: 276 K/CU MM (ref 140–440)
PMV BLD AUTO: 9.4 FL (ref 7.5–11.1)
RBC # BLD: 4.8 M/CU MM (ref 4.6–6.2)
TOTAL IMMATURE NEUTOROPHIL: 0.01 K/CU MM
TOTAL NUCLEATED RBC: 0 K/CU MM
WBC # BLD: 7.1 K/CU MM (ref 4–10.5)

## 2024-08-30 PROCEDURE — 85025 COMPLETE CBC W/AUTO DIFF WBC: CPT

## 2024-08-30 PROCEDURE — 36415 COLL VENOUS BLD VENIPUNCTURE: CPT

## 2024-09-10 ENCOUNTER — TELEPHONE (OUTPATIENT)
Dept: INTERNAL MEDICINE CLINIC | Age: 74
End: 2024-09-10

## 2024-09-10 ENCOUNTER — OFFICE VISIT (OUTPATIENT)
Dept: INTERNAL MEDICINE CLINIC | Age: 74
End: 2024-09-10

## 2024-09-10 VITALS
WEIGHT: 176 LBS | SYSTOLIC BLOOD PRESSURE: 136 MMHG | OXYGEN SATURATION: 99 % | DIASTOLIC BLOOD PRESSURE: 80 MMHG | BODY MASS INDEX: 23.84 KG/M2 | HEIGHT: 72 IN | HEART RATE: 67 BPM

## 2024-09-10 DIAGNOSIS — R35.1 BENIGN PROSTATIC HYPERPLASIA WITH NOCTURIA: ICD-10-CM

## 2024-09-10 DIAGNOSIS — I48.0 PAF (PAROXYSMAL ATRIAL FIBRILLATION) (HCC): ICD-10-CM

## 2024-09-10 DIAGNOSIS — E78.2 MIXED HYPERLIPIDEMIA: ICD-10-CM

## 2024-09-10 DIAGNOSIS — R73.03 PREDIABETES: Primary | ICD-10-CM

## 2024-09-10 DIAGNOSIS — N40.1 BENIGN PROSTATIC HYPERPLASIA WITH NOCTURIA: ICD-10-CM

## 2024-09-10 DIAGNOSIS — R26.9 GAIT DISTURBANCE: ICD-10-CM

## 2024-09-10 DIAGNOSIS — M89.8X1 PAIN OF RIGHT SCAPULA: ICD-10-CM

## 2024-09-10 ASSESSMENT — ENCOUNTER SYMPTOMS
NAUSEA: 0
ABDOMINAL PAIN: 0
SHORTNESS OF BREATH: 0
COUGH: 0
BACK PAIN: 0

## 2025-03-06 ENCOUNTER — TELEPHONE (OUTPATIENT)
Dept: INTERNAL MEDICINE CLINIC | Age: 75
End: 2025-03-06

## 2025-03-06 NOTE — TELEPHONE ENCOUNTER
I agree that the  patient should go to the ED for SOB. She can also call his  cardiologist as he is managing the amiodarone

## 2025-03-06 NOTE — TELEPHONE ENCOUNTER
Patient's wife called for a chest xray for shortness of breath due to Amiodarone. Advised to go to the ER patient declined

## 2025-03-07 ENCOUNTER — HOSPITAL ENCOUNTER (OUTPATIENT)
Age: 75
Discharge: HOME | End: 2025-03-07
Payer: COMMERCIAL

## 2025-03-07 ENCOUNTER — HOSPITAL ENCOUNTER (OUTPATIENT)
Dept: GENERAL RADIOLOGY | Age: 75
Discharge: HOME | End: 2025-03-07
Payer: COMMERCIAL

## 2025-03-07 DIAGNOSIS — R06.02 SOB (SHORTNESS OF BREATH): ICD-10-CM

## 2025-03-07 PROCEDURE — 71046 X-RAY EXAM CHEST 2 VIEWS: CPT

## 2025-05-18 SDOH — ECONOMIC STABILITY: INCOME INSECURITY: IN THE LAST 12 MONTHS, WAS THERE A TIME WHEN YOU WERE NOT ABLE TO PAY THE MORTGAGE OR RENT ON TIME?: NO

## 2025-05-18 SDOH — ECONOMIC STABILITY: FOOD INSECURITY: WITHIN THE PAST 12 MONTHS, YOU WORRIED THAT YOUR FOOD WOULD RUN OUT BEFORE YOU GOT MONEY TO BUY MORE.: NEVER TRUE

## 2025-05-18 SDOH — ECONOMIC STABILITY: FOOD INSECURITY: WITHIN THE PAST 12 MONTHS, THE FOOD YOU BOUGHT JUST DIDN'T LAST AND YOU DIDN'T HAVE MONEY TO GET MORE.: NEVER TRUE

## 2025-05-18 ASSESSMENT — PATIENT HEALTH QUESTIONNAIRE - PHQ9
1. LITTLE INTEREST OR PLEASURE IN DOING THINGS: NOT AT ALL
SUM OF ALL RESPONSES TO PHQ QUESTIONS 1-9: 0
SUM OF ALL RESPONSES TO PHQ QUESTIONS 1-9: 0
2. FEELING DOWN, DEPRESSED OR HOPELESS: NOT AT ALL
SUM OF ALL RESPONSES TO PHQ QUESTIONS 1-9: 0
SUM OF ALL RESPONSES TO PHQ QUESTIONS 1-9: 0

## 2025-05-18 ASSESSMENT — LIFESTYLE VARIABLES
HOW MANY STANDARD DRINKS CONTAINING ALCOHOL DO YOU HAVE ON A TYPICAL DAY: 0
HOW OFTEN DO YOU HAVE A DRINK CONTAINING ALCOHOL: 1
HOW MANY STANDARD DRINKS CONTAINING ALCOHOL DO YOU HAVE ON A TYPICAL DAY: PATIENT DOES NOT DRINK
HOW OFTEN DO YOU HAVE A DRINK CONTAINING ALCOHOL: NEVER
HOW OFTEN DO YOU HAVE SIX OR MORE DRINKS ON ONE OCCASION: 1

## 2025-05-21 ENCOUNTER — OFFICE VISIT (OUTPATIENT)
Dept: INTERNAL MEDICINE CLINIC | Age: 75
End: 2025-05-21
Payer: COMMERCIAL

## 2025-05-21 ENCOUNTER — PATIENT MESSAGE (OUTPATIENT)
Dept: INTERNAL MEDICINE CLINIC | Age: 75
End: 2025-05-21

## 2025-05-21 VITALS
OXYGEN SATURATION: 98 % | SYSTOLIC BLOOD PRESSURE: 130 MMHG | WEIGHT: 181 LBS | HEART RATE: 62 BPM | DIASTOLIC BLOOD PRESSURE: 82 MMHG | BODY MASS INDEX: 24.55 KG/M2

## 2025-05-21 DIAGNOSIS — I48.92 ATRIAL FLUTTER, UNSPECIFIED TYPE (HCC): ICD-10-CM

## 2025-05-21 DIAGNOSIS — E78.2 MIXED HYPERLIPIDEMIA: ICD-10-CM

## 2025-05-21 DIAGNOSIS — R97.20 ELEVATED PSA: ICD-10-CM

## 2025-05-21 DIAGNOSIS — R35.1 BENIGN PROSTATIC HYPERPLASIA WITH NOCTURIA: ICD-10-CM

## 2025-05-21 DIAGNOSIS — I10 ESSENTIAL HYPERTENSION: ICD-10-CM

## 2025-05-21 DIAGNOSIS — Z00.00 MEDICARE ANNUAL WELLNESS VISIT, SUBSEQUENT: Primary | ICD-10-CM

## 2025-05-21 DIAGNOSIS — N40.1 BENIGN PROSTATIC HYPERPLASIA WITH NOCTURIA: ICD-10-CM

## 2025-05-21 DIAGNOSIS — R73.03 PREDIABETES: ICD-10-CM

## 2025-05-21 PROCEDURE — 3075F SYST BP GE 130 - 139MM HG: CPT | Performed by: FAMILY MEDICINE

## 2025-05-21 PROCEDURE — 1160F RVW MEDS BY RX/DR IN RCRD: CPT | Performed by: FAMILY MEDICINE

## 2025-05-21 PROCEDURE — 3079F DIAST BP 80-89 MM HG: CPT | Performed by: FAMILY MEDICINE

## 2025-05-21 PROCEDURE — G0439 PPPS, SUBSEQ VISIT: HCPCS | Performed by: FAMILY MEDICINE

## 2025-05-21 PROCEDURE — 1123F ACP DISCUSS/DSCN MKR DOCD: CPT | Performed by: FAMILY MEDICINE

## 2025-05-21 PROCEDURE — 1159F MED LIST DOCD IN RCRD: CPT | Performed by: FAMILY MEDICINE

## 2025-05-21 NOTE — PROGRESS NOTES
leg: No edema.   Skin:     Findings: No rash.   Neurological:      Mental Status: He is alert and oriented to person, place, and time.      Cranial Nerves: No cranial nerve deficit.   Psychiatric:         Mood and Affect: Mood normal.                No Known Allergies  Prior to Visit Medications    Medication Sig Taking? Authorizing Provider   vitamin D3 (CHOLECALCIFEROL) 25 MCG (1000 UT) TABS tablet Take 1 tablet by mouth daily Yes Brandon Kelley MD   Krill Oil [place]-3 500 MG CAPS  Yes ProviderPage MD   metoprolol (LOPRESSOR) 100 MG tablet Take 1 tablet by mouth 2 times daily Yes ProviderPage MD   Misc Natural Products (PROSTATE SUPPORT PO) Take by mouth Yes ProviderPage MD   Multiple Vitamins-Minerals (THERAPEUTIC MULTIVITAMIN-MINERALS) tablet Take 1 tablet by mouth daily Yes ProviderPage MD       CareTeam (Including outside providers/suppliers regularly involved in providing care):   Patient Care Team:  Lolly Granados DO as PCP - General (Family Medicine)  Lolly Granados DO as PCP - Empaneled Provider  Wes Moore MD as Consulting Physician (Cardiology)     Recommendations for Preventive Services Due: see orders and patient instructions/AVS.  Recommended screening schedule for the next 5-10 years is provided to the patient in written form: see Patient Instructions/AVS.     Reviewed and updated this visit:  Tobacco  Allergies  Meds  Problems  Med Hx  Surg Hx  Fam Hx

## 2025-05-21 NOTE — PATIENT INSTRUCTIONS

## 2025-05-22 ENCOUNTER — HOSPITAL ENCOUNTER (OUTPATIENT)
Age: 75
Discharge: HOME | End: 2025-05-22
Payer: COMMERCIAL

## 2025-05-22 DIAGNOSIS — R97.20 ELEVATED PSA: ICD-10-CM

## 2025-05-22 DIAGNOSIS — R73.03 PREDIABETES: ICD-10-CM

## 2025-05-22 DIAGNOSIS — I10 ESSENTIAL HYPERTENSION: ICD-10-CM

## 2025-05-22 DIAGNOSIS — R35.1 BENIGN PROSTATIC HYPERPLASIA WITH NOCTURIA: ICD-10-CM

## 2025-05-22 DIAGNOSIS — E78.2 MIXED HYPERLIPIDEMIA: ICD-10-CM

## 2025-05-22 DIAGNOSIS — N40.1 BENIGN PROSTATIC HYPERPLASIA WITH NOCTURIA: ICD-10-CM

## 2025-05-22 LAB
ALBUMIN SERPL-MCNC: 4.2 G/DL (ref 3.4–5)
ALBUMIN/GLOB SERPL: 1.7 {RATIO} (ref 1.1–2.2)
ALP SERPL-CCNC: 73 U/L (ref 40–129)
ALT SERPL-CCNC: 34 U/L (ref 10–40)
ANION GAP SERPL CALCULATED.3IONS-SCNC: 13 MMOL/L (ref 9–17)
AST SERPL-CCNC: 21 U/L (ref 15–37)
BASOPHILS # BLD: 0.09 K/UL
BASOPHILS NFR BLD: 1 % (ref 0–1)
BILIRUB SERPL-MCNC: 0.4 MG/DL (ref 0–1)
BILIRUB UR QL STRIP: NEGATIVE
BUN SERPL-MCNC: 18 MG/DL (ref 7–20)
CALCIUM SERPL-MCNC: 9 MG/DL (ref 8.3–10.6)
CHLORIDE SERPL-SCNC: 105 MMOL/L (ref 99–110)
CHOLEST SERPL-MCNC: 180 MG/DL (ref 125–199)
CLARITY UR: CLEAR
CO2 SERPL-SCNC: 25 MMOL/L (ref 21–32)
COLOR UR: YELLOW
CREAT SERPL-MCNC: 1.1 MG/DL (ref 0.8–1.3)
EOSINOPHIL # BLD: 0.67 K/UL
EOSINOPHILS RELATIVE PERCENT: 9 % (ref 0–3)
EPI CELLS #/AREA URNS HPF: <1 /HPF
ERYTHROCYTE [DISTWIDTH] IN BLOOD BY AUTOMATED COUNT: 13.4 % (ref 11.7–14.9)
EST. AVERAGE GLUCOSE BLD GHB EST-MCNC: 125 MG/DL
GFR, ESTIMATED: 65 ML/MIN/1.73M2
GLUCOSE SERPL-MCNC: 97 MG/DL (ref 74–99)
GLUCOSE UR STRIP-MCNC: NEGATIVE MG/DL
HBA1C MFR BLD: 6 % (ref 4.2–6.3)
HCT VFR BLD AUTO: 44.8 % (ref 42–52)
HDLC SERPL-MCNC: 45 MG/DL
HGB BLD-MCNC: 14.9 G/DL (ref 13.5–18)
HGB UR QL STRIP.AUTO: NEGATIVE
IMM GRANULOCYTES # BLD AUTO: 0.01 K/UL
IMM GRANULOCYTES NFR BLD: 0 %
KETONES UR STRIP-MCNC: NEGATIVE MG/DL
LDLC SERPL CALC-MCNC: 107 MG/DL
LEUKOCYTE ESTERASE UR QL STRIP: ABNORMAL
LYMPHOCYTES NFR BLD: 2.6 K/UL
LYMPHOCYTES RELATIVE PERCENT: 33 % (ref 24–44)
MCH RBC QN AUTO: 30.4 PG (ref 27–31)
MCHC RBC AUTO-ENTMCNC: 33.3 G/DL (ref 32–36)
MCV RBC AUTO: 91.4 FL (ref 78–100)
MONOCYTES NFR BLD: 0.64 K/UL
MONOCYTES NFR BLD: 8 % (ref 0–5)
MUCOUS THREADS URNS QL MICRO: ABNORMAL
NEUTROPHILS NFR BLD: 49 % (ref 36–66)
NEUTS SEG NFR BLD: 3.84 K/UL
NITRITE UR QL STRIP: NEGATIVE
PH UR STRIP: 5.5 [PH] (ref 5–8)
PLATELET # BLD AUTO: 273 K/UL (ref 140–440)
PMV BLD AUTO: 9.1 FL (ref 7.5–11.1)
POTASSIUM SERPL-SCNC: 4.1 MMOL/L (ref 3.5–5.1)
PROT SERPL-MCNC: 6.6 G/DL (ref 6.4–8.2)
PROT UR STRIP-MCNC: NEGATIVE MG/DL
RBC # BLD AUTO: 4.9 M/UL (ref 4.6–6.2)
RBC #/AREA URNS HPF: 4 /HPF (ref 0–2)
SODIUM SERPL-SCNC: 142 MMOL/L (ref 136–145)
SP GR UR STRIP: 1.02 (ref 1–1.03)
TRIGL SERPL-MCNC: 139 MG/DL
UROBILINOGEN UR STRIP-ACNC: 0.2 EU/DL (ref 0–1)
WBC #/AREA URNS HPF: 8 /HPF (ref 0–5)
WBC OTHER # BLD: 7.9 K/UL (ref 4–10.5)

## 2025-05-22 PROCEDURE — 84153 ASSAY OF PSA TOTAL: CPT

## 2025-05-22 PROCEDURE — 84154 ASSAY OF PSA FREE: CPT

## 2025-05-22 PROCEDURE — 83036 HEMOGLOBIN GLYCOSYLATED A1C: CPT

## 2025-05-22 PROCEDURE — 80053 COMPREHEN METABOLIC PANEL: CPT

## 2025-05-22 PROCEDURE — 85025 COMPLETE CBC W/AUTO DIFF WBC: CPT

## 2025-05-22 PROCEDURE — 80061 LIPID PANEL: CPT

## 2025-05-22 PROCEDURE — 36415 COLL VENOUS BLD VENIPUNCTURE: CPT

## 2025-05-22 PROCEDURE — 81001 URINALYSIS AUTO W/SCOPE: CPT

## 2025-05-24 LAB
PROSTATE SPECIFIC ANTIGEN FREE: 0.9 NG/ML
PROSTATE SPECIFIC ANTIGEN PERCENT FREE: 21 %
PROSTATE SPECIFIC ANTIGEN: 4.2 NG/ML (ref 0–4)

## 2025-05-26 ENCOUNTER — RESULTS FOLLOW-UP (OUTPATIENT)
Dept: INTERNAL MEDICINE CLINIC | Age: 75
End: 2025-05-26